# Patient Record
Sex: FEMALE | Race: WHITE | NOT HISPANIC OR LATINO | Employment: OTHER | URBAN - METROPOLITAN AREA
[De-identification: names, ages, dates, MRNs, and addresses within clinical notes are randomized per-mention and may not be internally consistent; named-entity substitution may affect disease eponyms.]

---

## 2017-05-12 ENCOUNTER — TELEPHONE (OUTPATIENT)
Dept: NEUROLOGY | Facility: CLINIC | Age: 72
End: 2017-05-12

## 2017-05-12 NOTE — TELEPHONE ENCOUNTER
"----- Message from Lesa Paynein sent at 5/12/2017  7:34 AM CDT -----  Contact: Patient herself / #preferred # [phone]  X  1st Request  _  2nd Request  _  3rd Request    Who: Floridalma Romero (mrn# [id number])    Why: Patient called requesting a call back.  Says, "she has generalized concerns and questions."  Please give a call back at your earliest convenience.   THANKS!    What Number to Call Back: #preferred # [phone]    When to Expect a call back: (Before the end of the day)   -- if the call is after 12:00, the call back will be tomorrow.                        "

## 2017-05-12 NOTE — TELEPHONE ENCOUNTER
----- Message from Nieves Bar sent at 5/12/2017  3:28 PM CDT -----  Contact: pt  x_  1st Request  _  2nd Request  _  3rd Request      Who:pt    Why: pt returning call back,please advise pt    What Number to Call Back: [phone]    When to Expect a call back: (Before the end of the day)   -- if call after 3:00 call back will be tomorrow.

## 2017-05-12 NOTE — TELEPHONE ENCOUNTER
Patient called wanting advise re: ex  who has neurological issues.  Advised her to have her contact our office to make an appointment..

## 2017-05-12 NOTE — TELEPHONE ENCOUNTER
Called a couple of times but there was no answer and could not message on the number that she had given.  However did call her at home and left a message as to call back regarding what she needs to discuss.

## 2017-05-26 ENCOUNTER — OFFICE VISIT (OUTPATIENT)
Dept: NEUROLOGY | Facility: CLINIC | Age: 72
End: 2017-05-26
Payer: MEDICARE

## 2017-05-26 VITALS
BODY MASS INDEX: 18.79 KG/M2 | SYSTOLIC BLOOD PRESSURE: 103 MMHG | HEART RATE: 79 BPM | DIASTOLIC BLOOD PRESSURE: 67 MMHG | WEIGHT: 119.69 LBS | HEIGHT: 67 IN

## 2017-05-26 DIAGNOSIS — G44.219 EPISODIC TENSION-TYPE HEADACHE, NOT INTRACTABLE: ICD-10-CM

## 2017-05-26 DIAGNOSIS — M51.37 DEGENERATION OF LUMBAR OR LUMBOSACRAL INTERVERTEBRAL DISC: Primary | ICD-10-CM

## 2017-05-26 DIAGNOSIS — F41.9 ANXIETY DISORDER, UNSPECIFIED TYPE: ICD-10-CM

## 2017-05-26 PROCEDURE — 99214 OFFICE O/P EST MOD 30 MIN: CPT | Mod: S$PBB,,, | Performed by: PSYCHIATRY & NEUROLOGY

## 2017-05-26 PROCEDURE — 99999 PR PBB SHADOW E&M-EST. PATIENT-LVL III: CPT | Mod: PBBFAC,,, | Performed by: PSYCHIATRY & NEUROLOGY

## 2017-05-26 PROCEDURE — 99213 OFFICE O/P EST LOW 20 MIN: CPT | Mod: PBBFAC,PO | Performed by: PSYCHIATRY & NEUROLOGY

## 2017-05-26 RX ORDER — ATORVASTATIN CALCIUM 40 MG/1
40 TABLET, FILM COATED ORAL DAILY
COMMUNITY

## 2017-05-26 NOTE — PROGRESS NOTES
Subjective:       Patient ID: Floridalma Romero is a 71 y.o. female.    Chief Complaint:  Back Pain and Headache      History of Present Illness  HPI   This is a 71-year-old female who is being followed by me for chronic low back pain with left leg pain in the past.  She was last seen by me in October 2015.  She has been stable but she does occasionally continue to get the low back pain and right leg pain if she is on feet for long periods of time. She has had a history of headaches that have improved. She has a history of cardiac disease being followed by Dr. Tolliver, but this has been stable.  She has now moved to [place] but comes here as she has a house here and has a daughter who lives here.  She had been  from her  over 3 years now.  She denies any new medical problems otherwise.  She recently saw her cardiac specialist and has a workup planned.  She is only on Tylenol #3 as needed for severe back pain, and Valium as needed for muscle spasms however only takes it infrequently.  She has been off the Vicodin.       Review of Systems  Review of Systems   Constitutional: Negative.    Eyes: Negative.    Respiratory: Negative.    Cardiovascular: Negative.    Gastrointestinal: Negative.    Genitourinary: Negative.    Musculoskeletal: Positive for back pain.   Skin: Negative.    Neurological: Positive for headaches.   Psychiatric/Behavioral: The patient is nervous/anxious.        Objective:      Neurologic Exam     Gait, Coordination, and Reflexes     Reflexes   Right brachioradialis: 1+  Left brachioradialis: 1+  Right biceps: 1+  Left biceps: 1+  Right triceps: 1+  Left triceps: 1+  Right patellar: 1+  Left patellar: 1+  Right achilles: 0  Left achilles: 1+      Physical Exam   Constitutional: She appears well-developed and well-nourished.   HENT:   Head: Normocephalic and atraumatic.   Right Ear: Hearing normal.   Left Ear: Hearing normal.   Eyes:   First examination showed sharp disc margins.   Pupils equal and reactive to light with EOM being full   Neck: Normal range of motion. Neck supple. Carotid bruit is not present.   Musculoskeletal:   Back exam: SLR 90 degrees bilaterally with low back pain predominantly on the right, nonradiating.   Neurological: She is alert. She displays no atrophy. No cranial nerve deficit (Visual fields at bedside testing essentially normal.  No facial asymmetry noted with facial movements and sensory exam being normal/symmetrical.  Corneals/gag reflexes normal.  Tongue & palate movements normal.  Shoulder shrug was normal.) or sensory deficit. She exhibits normal muscle tone. She displays a negative Romberg sign. Coordination and gait normal. She displays no Babinski's sign on the right side. She displays no Babinski's sign on the left side.   Reflex Scores:       Tricep reflexes are 1+ on the right side and 1+ on the left side.       Bicep reflexes are 1+ on the right side and 1+ on the left side.       Brachioradialis reflexes are 1+ on the right side and 1+ on the left side.       Patellar reflexes are 1+ on the right side and 1+ on the left side.       Achilles reflexes are 0 on the right side and 1+ on the left side.  Mental status examination: Patient is fully oriented and able to give an adequate history.  Recall of recent and past information is good.  Immediate recall is normal.  Attention span and concentration was normal.  Judgment and insight is normal.  Language functions are intact with no evidence of aphasia or dysarthria.  Comprehension is unimpaired.  Affect was slightly blunted, mood is anxious.  No thought disorder is noted.  No suicidal ideations expressed.         Assessment:        1. Degeneration of lumbar or lumbosacral intervertebral disc    2. Anxiety disorder, unspecified type    3. Episodic tension-type headache, not intractable             Plan:       Discussed back precautions with patient.  No medication changes.  She will follow-up in one year  if stable.

## 2017-06-06 DIAGNOSIS — M51.37 DEGENERATION OF LUMBAR OR LUMBOSACRAL INTERVERTEBRAL DISC: ICD-10-CM

## 2017-06-07 RX ORDER — ACETAMINOPHEN AND CODEINE PHOSPHATE 300; 30 MG/1; MG/1
TABLET ORAL
Qty: 60 TABLET | Refills: 3 | Status: SHIPPED | OUTPATIENT
Start: 2017-06-07 | End: 2017-12-06 | Stop reason: SDUPTHER

## 2017-07-20 DIAGNOSIS — G44.219 EPISODIC TENSION-TYPE HEADACHE, NOT INTRACTABLE: ICD-10-CM

## 2017-07-20 DIAGNOSIS — M51.37 DEGENERATION OF LUMBAR OR LUMBOSACRAL INTERVERTEBRAL DISC: ICD-10-CM

## 2017-07-20 RX ORDER — DIAZEPAM 5 MG/1
5 TABLET ORAL EVERY 8 HOURS PRN
Qty: 90 TABLET | Refills: 5 | Status: SHIPPED | OUTPATIENT
Start: 2017-07-20 | End: 2017-09-06 | Stop reason: SDUPTHER

## 2017-09-06 DIAGNOSIS — G44.219 EPISODIC TENSION-TYPE HEADACHE, NOT INTRACTABLE: ICD-10-CM

## 2017-09-06 DIAGNOSIS — M51.37 DEGENERATION OF LUMBAR OR LUMBOSACRAL INTERVERTEBRAL DISC: ICD-10-CM

## 2017-09-06 RX ORDER — DIAZEPAM 5 MG/1
TABLET ORAL
Qty: 90 TABLET | Refills: 3 | Status: SHIPPED | OUTPATIENT
Start: 2017-09-06 | End: 2018-04-23 | Stop reason: SDUPTHER

## 2017-12-06 DIAGNOSIS — M51.37 DEGENERATION OF LUMBAR OR LUMBOSACRAL INTERVERTEBRAL DISC: ICD-10-CM

## 2017-12-06 RX ORDER — ACETAMINOPHEN AND CODEINE PHOSPHATE 300; 30 MG/1; MG/1
1 TABLET ORAL 3 TIMES DAILY PRN
Qty: 60 TABLET | Refills: 3 | Status: SHIPPED | OUTPATIENT
Start: 2017-12-06 | End: 2018-06-15

## 2018-04-23 DIAGNOSIS — G44.219 EPISODIC TENSION-TYPE HEADACHE, NOT INTRACTABLE: ICD-10-CM

## 2018-04-23 DIAGNOSIS — M51.37 DEGENERATION OF LUMBAR OR LUMBOSACRAL INTERVERTEBRAL DISC: ICD-10-CM

## 2018-04-23 RX ORDER — DIAZEPAM 5 MG/1
TABLET ORAL
Qty: 90 TABLET | Refills: 1 | Status: SHIPPED | OUTPATIENT
Start: 2018-04-23 | End: 2018-06-15 | Stop reason: SDUPTHER

## 2018-05-21 ENCOUNTER — TELEPHONE (OUTPATIENT)
Dept: NEUROLOGY | Facility: CLINIC | Age: 73
End: 2018-05-21

## 2018-05-21 NOTE — TELEPHONE ENCOUNTER
----- Message from Dahlia Ron sent at 5/21/2018  9:50 AM CDT -----  Contact: LUCAS GILLETTE [[id number]]            Name of Who is Calling: LUCAS GILLETTE [[id number]]      What is the request in detail: Pt is calling to request a sooner appointment than July 7.  Pt says that she will be out of town come June 27th and would like be seen before she leave.  I scheduled pt for July 7 and added her to the waiting list.  Please contact pt to further discuss and advise.      Can the clinic reply by [facility]: No      What Number to Call Back if not in [facility]: [phone]

## 2018-05-21 NOTE — TELEPHONE ENCOUNTER
KALYANI for appointment on 6-8-18 in [place]. To contact me back if this is not a good date/time.

## 2018-05-30 ENCOUNTER — TELEPHONE (OUTPATIENT)
Dept: NEUROLOGY | Facility: CLINIC | Age: 73
End: 2018-05-30

## 2018-05-30 NOTE — TELEPHONE ENCOUNTER
----- Message from Dahlia CALLOWAY Asaf sent at 5/30/2018  8:47 AM CDT -----  Contact: LUCAS GILLETTE [[id number]]            Name of Who is Calling: LUCAS GILLETTE [[id number]]    What is the request in detail: Pt says that she's returning a missed call on yesterday to clinical staff.  Pt says that she won't be in town July 6 but will be in for her appointment on Melissa 15 at 8 am.  Please contact pt to further discuss and advise.    Can the clinic reply by [facility]: No      What Number to Call Back if not in [facility]: [phone] or [phone]

## 2018-06-15 ENCOUNTER — OFFICE VISIT (OUTPATIENT)
Dept: NEUROLOGY | Facility: CLINIC | Age: 73
End: 2018-06-15
Payer: MEDICARE

## 2018-06-15 VITALS
DIASTOLIC BLOOD PRESSURE: 69 MMHG | WEIGHT: 117.19 LBS | BODY MASS INDEX: 19.53 KG/M2 | HEIGHT: 65 IN | HEART RATE: 57 BPM | SYSTOLIC BLOOD PRESSURE: 105 MMHG

## 2018-06-15 DIAGNOSIS — M19.90 OSTEOARTHRITIS, UNSPECIFIED OSTEOARTHRITIS TYPE, UNSPECIFIED SITE: ICD-10-CM

## 2018-06-15 DIAGNOSIS — I25.10 CORONARY ARTERY DISEASE, ANGINA PRESENCE UNSPECIFIED, UNSPECIFIED VESSEL OR LESION TYPE, UNSPECIFIED WHETHER NATIVE OR TRANSPLANTED HEART: ICD-10-CM

## 2018-06-15 DIAGNOSIS — G44.219 EPISODIC TENSION-TYPE HEADACHE, NOT INTRACTABLE: ICD-10-CM

## 2018-06-15 DIAGNOSIS — F41.9 ANXIETY DISORDER, UNSPECIFIED TYPE: ICD-10-CM

## 2018-06-15 DIAGNOSIS — M51.37 DEGENERATION OF LUMBAR OR LUMBOSACRAL INTERVERTEBRAL DISC: Primary | ICD-10-CM

## 2018-06-15 DIAGNOSIS — E78.5 HYPERLIPIDEMIA, UNSPECIFIED HYPERLIPIDEMIA TYPE: ICD-10-CM

## 2018-06-15 PROCEDURE — 99214 OFFICE O/P EST MOD 30 MIN: CPT | Mod: S$PBB,,, | Performed by: PSYCHIATRY & NEUROLOGY

## 2018-06-15 PROCEDURE — 99999 PR PBB SHADOW E&M-EST. PATIENT-LVL III: CPT | Mod: PBBFAC,,, | Performed by: PSYCHIATRY & NEUROLOGY

## 2018-06-15 PROCEDURE — 99213 OFFICE O/P EST LOW 20 MIN: CPT | Mod: PBBFAC,PO | Performed by: PSYCHIATRY & NEUROLOGY

## 2018-06-15 RX ORDER — ACETAMINOPHEN AND CODEINE PHOSPHATE 300; 30 MG/1; MG/1
1 TABLET ORAL 3 TIMES DAILY PRN
Qty: 60 TABLET | Refills: 3 | Status: SHIPPED | OUTPATIENT
Start: 2018-06-15 | End: 2019-01-29 | Stop reason: SDUPTHER

## 2018-06-15 RX ORDER — ASPIRIN 325 MG
325 TABLET ORAL
COMMUNITY
End: 2019-04-12

## 2018-06-15 RX ORDER — DIAZEPAM 5 MG/1
5 TABLET ORAL EVERY 8 HOURS PRN
Qty: 90 TABLET | Refills: 1 | Status: SHIPPED | OUTPATIENT
Start: 2018-06-15 | End: 2018-08-23 | Stop reason: SDUPTHER

## 2018-06-15 RX ORDER — IBUPROFEN 200 MG
200 TABLET ORAL
COMMUNITY

## 2018-06-15 RX ORDER — SIMVASTATIN 40 MG/1
40 TABLET, FILM COATED ORAL
COMMUNITY
End: 2018-06-15

## 2018-06-15 NOTE — PATIENT INSTRUCTIONS
Continue medication with no changes.  Consult patient regarding avoiding overuse of pain medication.  Discussed back precautions.

## 2018-06-15 NOTE — PROGRESS NOTES
Subjective:       Patient ID: Floridalma Romero is a 72 y.o. female.    Chief Complaint:  Back Pain      History of Present Illness  HPI   This is a 72-year-old female who is being followed by me for chronic low back pain with left leg pain in the past.  She was last seen by me one year ago.  She has been stable but she does occasionally continue to get the low back pain and right leg pain if she is on feet for long periods of time. She has had a history of headaches that have improved. She has a history of cardiac disease being followed by Dr. Tolliver, but this has been stable.  She has now moved to [place] but comes here as she has a house here and has a daughter who lives here.  She had been  from her  and he recently  after a long illness.  She denies any new medical problems otherwise.  She recently saw her cardiac specialist and has an angiogram planned.  She is only on Tylenol #3 as needed for severe back pain, and Valium as needed for muscle spasms however only takes it infrequently.         Review of Systems  Review of Systems   Constitutional: Negative.    Eyes: Negative.    Respiratory: Negative.    Cardiovascular: Negative.    Gastrointestinal: Negative.    Genitourinary: Negative.    Musculoskeletal: Positive for back pain.   Skin: Negative.    Neurological: Positive for headaches.   Psychiatric/Behavioral: The patient is nervous/anxious.        Objective:      Neurologic Exam     Gait, Coordination, and Reflexes     Reflexes   Right brachioradialis: 1+  Left brachioradialis: 1+  Right biceps: 1+  Left biceps: 1+  Right triceps: 1+  Left triceps: 1+  Right patellar: 1+  Left patellar: 1+  Right achilles: 0  Left achilles: 1+      Physical Exam   Constitutional: She appears well-developed and well-nourished.   HENT:   Head: Normocephalic and atraumatic.   Right Ear: Hearing normal.   Left Ear: Hearing normal.   Eyes:   First examination showed sharp disc margins.  Pupils equal and  reactive to light with EOM being full   Neck: Normal range of motion. Neck supple. Carotid bruit is not present.   Musculoskeletal:   Back exam: SLR 90 degrees bilaterally with low back pain predominantly on the right, nonradiating.   Neurological: She is alert. She displays no atrophy. No cranial nerve deficit (Visual fields at bedside testing essentially normal.  No facial asymmetry noted with facial movements and sensory exam being normal/symmetrical.  Corneals/gag reflexes normal.  Tongue & palate movements normal.  Shoulder shrug was normal.) or sensory deficit. She exhibits normal muscle tone. She displays a negative Romberg sign. Coordination and gait normal. She displays no Babinski's sign on the right side. She displays no Babinski's sign on the left side.   Reflex Scores:       Tricep reflexes are 1+ on the right side and 1+ on the left side.       Bicep reflexes are 1+ on the right side and 1+ on the left side.       Brachioradialis reflexes are 1+ on the right side and 1+ on the left side.       Patellar reflexes are 1+ on the right side and 1+ on the left side.       Achilles reflexes are 0 on the right side and 1+ on the left side.  Mental status examination: Patient is fully oriented and able to give an adequate history.  Recall of recent and past information is good.  Immediate recall is normal.  Attention span and concentration was normal.  Judgment and insight is normal.  Language functions are intact with no evidence of aphasia or dysarthria.  Comprehension is unimpaired.  Affect was slightly blunted, mood is anxious.  No thought disorder is noted.  No suicidal ideations expressed.   Vitals reviewed.        Assessment:        1. Degeneration of lumbar or lumbosacral intervertebral disc    2. Episodic tension-type headache, not intractable    3. Osteoarthritis, unspecified osteoarthritis type, unspecified site    4. Hyperlipidemia, unspecified hyperlipidemia type    5. Coronary artery disease,  angina presence unspecified, unspecified vessel or lesion type, unspecified whether native or transplanted heart    6. Anxiety disorder, unspecified type             Plan:       Discussed back precautions with patient.  No medication changes.  She will follow-up in one year if stable.

## 2018-08-23 ENCOUNTER — TELEPHONE (OUTPATIENT)
Dept: NEUROLOGY | Facility: CLINIC | Age: 73
End: 2018-08-23

## 2018-08-23 DIAGNOSIS — M51.37 DEGENERATION OF LUMBAR OR LUMBOSACRAL INTERVERTEBRAL DISC: ICD-10-CM

## 2018-08-23 DIAGNOSIS — G44.219 EPISODIC TENSION-TYPE HEADACHE, NOT INTRACTABLE: ICD-10-CM

## 2018-08-23 RX ORDER — DIAZEPAM 5 MG/1
5 TABLET ORAL EVERY 8 HOURS PRN
Qty: 90 TABLET | Refills: 1 | Status: SHIPPED | OUTPATIENT
Start: 2018-08-23 | End: 2018-08-24 | Stop reason: SDUPTHER

## 2018-08-23 RX ORDER — DIAZEPAM 5 MG/1
TABLET ORAL
Qty: 90 TABLET | Refills: 0 | OUTPATIENT
Start: 2018-08-23

## 2018-08-23 NOTE — TELEPHONE ENCOUNTER
----- Message from Tyra Mejia sent at 8/23/2018  9:12 AM CDT -----  Contact: LUCAS GILLETTE [[id number]]            Name of Who is Calling: LUCAS GILLETTE [[id number]]      What is the request in detail: patient states [facility] pharmacy need the okay to fill Valium medication. Please call     Can the clinic reply by [facility]: no    [facility]: [phone]

## 2018-08-24 RX ORDER — DIAZEPAM 5 MG/1
5 TABLET ORAL EVERY 8 HOURS PRN
Qty: 90 TABLET | Refills: 1 | Status: SHIPPED | OUTPATIENT
Start: 2018-08-24 | End: 2019-01-29 | Stop reason: SDUPTHER

## 2019-01-29 DIAGNOSIS — G44.219 EPISODIC TENSION-TYPE HEADACHE, NOT INTRACTABLE: ICD-10-CM

## 2019-01-29 DIAGNOSIS — M51.37 DEGENERATION OF LUMBAR OR LUMBOSACRAL INTERVERTEBRAL DISC: ICD-10-CM

## 2019-01-29 RX ORDER — DIAZEPAM 5 MG/1
TABLET ORAL
Qty: 90 TABLET | Refills: 2 | Status: SHIPPED | OUTPATIENT
Start: 2019-01-29 | End: 2019-06-21 | Stop reason: SDUPTHER

## 2019-01-29 RX ORDER — ACETAMINOPHEN AND CODEINE PHOSPHATE 300; 30 MG/1; MG/1
TABLET ORAL
Qty: 60 TABLET | Refills: 0 | Status: SHIPPED | OUTPATIENT
Start: 2019-01-29 | End: 2019-03-28 | Stop reason: SDUPTHER

## 2019-03-28 DIAGNOSIS — M51.37 DEGENERATION OF LUMBAR OR LUMBOSACRAL INTERVERTEBRAL DISC: ICD-10-CM

## 2019-03-28 RX ORDER — ACETAMINOPHEN AND CODEINE PHOSPHATE 300; 30 MG/1; MG/1
TABLET ORAL
Qty: 60 TABLET | Refills: 0 | Status: SHIPPED | OUTPATIENT
Start: 2019-03-28 | End: 2019-05-07 | Stop reason: SDUPTHER

## 2019-03-28 NOTE — TELEPHONE ENCOUNTER
acetaminophen-codeine 300-30mg (TYLENOL #3) 300-30 mg Tab          Summary: TAKE ONE BY MOUTH THREE TIMES DAILY AS NEEDED, Normal   Start: 1/29/2019  Ord/Sold: 1/29/2019 (O)  Report  Adh:   Long-term:   Pharmacy: [facility] - [place]rell - [address]  Med Dose History       Patient Sig: TAKE ONE BY MOUTH THREE TIMES DAILY AS NEEDED       Ordered on: 1/29/2019       Authorized by: YANIRA ASTUDILLO       Dispense: 60 tablet       Refills: 0 ordered

## 2019-03-30 ENCOUNTER — OFFICE VISIT (OUTPATIENT)
Dept: URGENT CARE | Facility: CLINIC | Age: 74
End: 2019-03-30
Payer: MEDICARE

## 2019-03-30 VITALS
RESPIRATION RATE: 19 BRPM | WEIGHT: 119 LBS | HEART RATE: 79 BPM | DIASTOLIC BLOOD PRESSURE: 63 MMHG | TEMPERATURE: 98 F | HEIGHT: 67 IN | SYSTOLIC BLOOD PRESSURE: 90 MMHG | OXYGEN SATURATION: 94 % | BODY MASS INDEX: 18.68 KG/M2

## 2019-03-30 DIAGNOSIS — J20.9 ACUTE BRONCHITIS, UNSPECIFIED ORGANISM: Primary | ICD-10-CM

## 2019-03-30 DIAGNOSIS — R06.2 WHEEZING: ICD-10-CM

## 2019-03-30 PROCEDURE — 99203 OFFICE O/P NEW LOW 30 MIN: CPT | Mod: 25,S$GLB,, | Performed by: NURSE PRACTITIONER

## 2019-03-30 PROCEDURE — 71046 XR CHEST PA AND LATERAL: ICD-10-PCS | Mod: FY,S$GLB,, | Performed by: RADIOLOGY

## 2019-03-30 PROCEDURE — 99203 PR OFFICE/OUTPT VISIT, NEW, LEVL III, 30-44 MIN: ICD-10-PCS | Mod: 25,S$GLB,, | Performed by: NURSE PRACTITIONER

## 2019-03-30 PROCEDURE — 94640 PR INHAL RX, AIRWAY OBST/DX SPUTUM INDUCT: ICD-10-PCS | Mod: 59,S$GLB,, | Performed by: NURSE PRACTITIONER

## 2019-03-30 PROCEDURE — 94640 AIRWAY INHALATION TREATMENT: CPT | Mod: 59,S$GLB,, | Performed by: NURSE PRACTITIONER

## 2019-03-30 PROCEDURE — 71046 X-RAY EXAM CHEST 2 VIEWS: CPT | Mod: FY,S$GLB,, | Performed by: RADIOLOGY

## 2019-03-30 RX ORDER — IPRATROPIUM BROMIDE 0.5 MG/2.5ML
0.5 SOLUTION RESPIRATORY (INHALATION)
Status: COMPLETED | OUTPATIENT
Start: 2019-03-30 | End: 2019-03-30

## 2019-03-30 RX ORDER — DOXYCYCLINE 100 MG/1
100 CAPSULE ORAL 2 TIMES DAILY
Qty: 14 CAPSULE | Refills: 0 | Status: SHIPPED | OUTPATIENT
Start: 2019-03-30 | End: 2019-04-06

## 2019-03-30 RX ORDER — ALBUTEROL SULFATE 0.83 MG/ML
2.5 SOLUTION RESPIRATORY (INHALATION)
Status: COMPLETED | OUTPATIENT
Start: 2019-03-30 | End: 2019-03-30

## 2019-03-30 RX ORDER — ALBUTEROL SULFATE 90 UG/1
2 AEROSOL, METERED RESPIRATORY (INHALATION)
Qty: 1 INHALER | Refills: 0 | Status: SHIPPED | OUTPATIENT
Start: 2019-03-30

## 2019-03-30 RX ORDER — PREDNISONE 20 MG/1
20 TABLET ORAL DAILY
Qty: 4 TABLET | Refills: 0 | Status: SHIPPED | OUTPATIENT
Start: 2019-03-30 | End: 2019-04-03

## 2019-03-30 RX ADMIN — IPRATROPIUM BROMIDE 0.5 MG: 0.5 SOLUTION RESPIRATORY (INHALATION) at 11:03

## 2019-03-30 RX ADMIN — ALBUTEROL SULFATE 2.5 MG: 0.83 SOLUTION RESPIRATORY (INHALATION) at 11:03

## 2019-03-30 NOTE — PATIENT INSTRUCTIONS
START USING HER BREO INHALER AS PRESCRIBED.  USE ALBUTEROL AS NEEDED EVERY 4-6 HOURS FOR WHEEZING/SHORTNESS OF BREATH.  START TAKING MUCINEX TWICE A DAY.  DELSYM FOR COUGH.  FOLLOW UP WITH YOUR PCP IF YOU CONTINUE TO WORSEN OR DO NOT IMPROVE.     SMOKING CESSATION EDUCATION- CONTINUE FOLLOW UP WITH EJ REGARDING THIS AS THIS IS GREAT FOR YOUR OVERALL HEALTH    You must understand that you've received an Urgent Care treatment only and that you may be released before all your medical problems are known or treated. You, the patient, will arrange for follow up care as instructed.  If your condition worsens we recommend that you receive another evaluation at the emergency room immediately or contact your primary medical clinics after hours call service to discuss your concerns.  Please return here or go to the Emergency Department for any concerns or worsening of condition.      Bronchitis with Wheezing (Viral or Bacterial: Adult)    Bronchitis is an infection of the air passages. It often occurs during a cold and is usually caused by a virus. Symptoms include cough with mucus (phlegm) and low-grade fever. This illness is contagious during the first few days and is spread through the air by coughing and sneezing, or by direct contact (touching the sick person and then touching your own eyes, nose, or mouth).  If there is a lot of inflammation, air flow is restricted. The air passages may also go into spasm, especially if you have asthma. This causes wheezing and difficulty breathing even in people who do not have asthma.  Bronchitis usually lasts 7 to 14 days. The wheezing should improve with treatment during the first week. An inhaler is often prescribed to relax the air passages and stop wheezing. Antibiotics will be prescribed if your doctor thinks there is also a secondary bacterial infection.  Home care  · If symptoms are severe, rest at home for the first 2 to 3 days. When you go back to your usual activities,  don't let yourself get too tired.  · Do not smoke. Also avoid being exposed to secondhand smoke.  · You may use over-the-counter medicine to control fever or pain, unless another medicine was prescribed. Note: If you have chronic liver or kidney disease or have ever had a stomach ulcer or gastrointestinal bleeding, talk with your healthcare provider before using these medicines. Also talk to your provider if you are taking medicine to prevent blood clots.) Aspirin should never be given to anyone younger than 18 years of age who is ill with a viral infection or fever. It may cause severe liver or brain damage.  · Your appetite may be poor, so a light diet is fine. Avoid dehydration by drinking 6 to 8 glasses of fluids per day (such as water, soft drinks, sports drinks, juices, tea, or soup). Extra fluids will help loosen secretions in the nose and lungs.  · Over-the-counter cough, cold, and sore-throat medicines will not shorten the length of the illness, but they may be helpful to reduce symptoms. (Note: Do not use decongestants if you have high blood pressure.)  · If you were given an inhaler, use it exactly as directed. If you need to use it more often than prescribed, your condition may be worsening. If this happens, contact your healthcare provider.  · If prescribed, finish all antibiotic medicine, even if you are feeling better after only a few days.  Follow-up care  Follow up with your healthcare provider, or as advised. If you had an X-ray or ECG (electrocardiogram), a specialist will review it. You will be notified of any new findings that may affect your care.  Note: If you are age 65 or older, or if you have a chronic lung disease or condition that affects your immune system, or you smoke, talk to your healthcare provider about having a pneumococcal vaccinations and a yearly influenza vaccination (flu shot).  When to seek medical advice  Call your healthcare provider right away if any of these  occur:  · Fever of 100.4°F (38°C) or higher  · Coughing up increasing amounts of colored sputum  · Weakness, drowsiness, headache, facial pain, ear pain, or a stiff neck  Call 911, or get immediate medical care  Contact emergency services right away if any of these occur.  · Coughing up blood  · Worsening weakness, drowsiness, headache, or stiff neck  · Increased wheezing not helped with medication, shortness of breath, or pain with breathing  Date Last Reviewed: 9/13/2015  © 7172-1090 [employer]. [address] [place] [address]. All rights reserved. This information is not intended as a substitute for professional medical care. Always follow your healthcare professional's instructions.

## 2019-03-30 NOTE — PROGRESS NOTES
"Subjective:       Patient ID: Floridalma Romero is a 73 y.o. female.    Vitals:  height is 5' 7" (1.702 m) and weight is 54 kg (119 lb). Her oral temperature is 98.4 °F (36.9 °C). Her blood pressure is 90/63 and her pulse is 79. Her respiration is 19 and oxygen saturation is 94% (abnormal).     Chief Complaint: Cough    This is a 73 year old female who presents to clinic today with complaints of wheezing, cough, and chills for the past three weeks. She states she does smoke and has a referral for smoking cessation program. She is a patient at . She states she does have a hx of emphysema and has been prescribed Breo but hasn't started using it yet.     Cough   This is a new problem. The current episode started more than 1 month ago (16 weeks ). The problem has been unchanged. The problem occurs constantly. The cough is non-productive. Associated symptoms include chills and wheezing. Pertinent negatives include no chest pain, ear congestion, ear pain, eye redness, fever, headaches, heartburn, hemoptysis, myalgias, nasal congestion, postnasal drip, rash, rhinorrhea, sore throat, shortness of breath, sweats or weight loss. The symptoms are aggravated by lying down. She has tried ipratropium inhaler (aspirin) for the symptoms. The treatment provided mild relief. Her past medical history is significant for bronchitis. There is no history of asthma, bronchiectasis, COPD, emphysema, environmental allergies or pneumonia.       Constitution: Positive for chills. Negative for sweating, fatigue and fever.   HENT: Negative for ear pain, congestion, postnasal drip, sinus pain, sinus pressure, sore throat and voice change.    Neck: Negative for neck pain and painful lymph nodes.   Cardiovascular: Negative for chest pain.   Eyes: Negative for eye redness.   Respiratory: Positive for cough and wheezing. Negative for chest tightness, sputum production, bloody sputum, COPD, shortness of breath, stridor and asthma.  "   Gastrointestinal: Negative for abdominal pain, nausea, vomiting, diarrhea and heartburn.   Genitourinary: Negative for dysuria.   Musculoskeletal: Negative for muscle ache.   Skin: Negative for rash.   Allergic/Immunologic: Negative for environmental allergies, seasonal allergies, asthma and sneezing.   Neurological: Negative for headaches.   Hematologic/Lymphatic: Negative for swollen lymph nodes.       Objective:      Physical Exam   Constitutional: She is oriented to person, place, and time. She appears well-developed and well-nourished. She is cooperative.  Non-toxic appearance. She does not appear ill. No distress.   HENT:   Head: Normocephalic and atraumatic.   Right Ear: Hearing, tympanic membrane, external ear and ear canal normal.   Left Ear: Hearing, tympanic membrane, external ear and ear canal normal.   Nose: Nose normal. No mucosal edema, rhinorrhea or nasal deformity. No epistaxis. Right sinus exhibits no maxillary sinus tenderness and no frontal sinus tenderness. Left sinus exhibits no maxillary sinus tenderness and no frontal sinus tenderness.   Mouth/Throat: Uvula is midline, oropharynx is clear and moist and mucous membranes are normal. No trismus in the jaw. Normal dentition. No uvula swelling. No posterior oropharyngeal erythema.   Eyes: Conjunctivae and lids are normal. No scleral icterus.   Sclera clear bilat   Neck: Trachea normal, full passive range of motion without pain and phonation normal. Neck supple.   Cardiovascular: Normal rate, regular rhythm, normal heart sounds, intact distal pulses and normal pulses.   Pulmonary/Chest: Effort normal. No respiratory distress. She has wheezes in the right upper field, the right middle field, the right lower field and the left middle field.   PRE-TREATMENT:  94      SpO2%                  POST-TREATMENT:   95  SpO2%               Lung Sounds: still with exp wheezing    Pt tolerated breathing treatment well and reports significant improvement.    Abdominal: Soft. Normal appearance and bowel sounds are normal. She exhibits no distension. There is no tenderness.   Musculoskeletal: Normal range of motion. She exhibits no edema or deformity.   Neurological: She is alert and oriented to person, place, and time. She exhibits normal muscle tone. Coordination normal.   Skin: Skin is warm, dry and intact. She is not diaphoretic. No pallor.   Psychiatric: She has a normal mood and affect. Her speech is normal and behavior is normal. Judgment and thought content normal. Cognition and memory are normal.   Nursing note and vitals reviewed.      CXR: Frontal lateral views show sternotomy wires.  Heart is normal size.  The visualized spine appears intact.  No pneumothorax or pleural effusion or interstitial edema or consolidation or nodular cavitary lesion or abdominal free air.  There is perhaps mild hyperinflation.  The trachea appears normal.  The mediastinal contours and hilar shadows appear normal.  Assessment:       1. Acute bronchitis, unspecified organism    2. Wheezing        Plan:         Acute bronchitis, unspecified organism  -     albuterol (PROVENTIL/VENTOLIN HFA) 90 mcg/actuation inhaler; Inhale 2 puffs into the lungs every 4 to 6 hours as needed for Wheezing or Shortness of Breath. Rescue  Dispense: 1 Inhaler; Refill: 0  -     predniSONE (DELTASONE) 20 MG tablet; Take 1 tablet (20 mg total) by mouth once daily. for 4 days  Dispense: 4 tablet; Refill: 0  -     doxycycline (VIBRAMYCIN) 100 MG Cap; Take 1 capsule (100 mg total) by mouth 2 (two) times daily. for 7 days  Dispense: 14 capsule; Refill: 0    Wheezing  -     XR CHEST PA AND LATERAL; Future; Expected date: 03/30/2019  -     ipratropium 0.02 % nebulizer solution 0.5 mg  -     albuterol nebulizer solution 2.5 mg            Patient Instructions     START USING HER BREO INHALER AS PRESCRIBED.  USE ALBUTEROL AS NEEDED EVERY 4-6 HOURS FOR WHEEZING/SHORTNESS OF BREATH.  START TAKING MUCINEX TWICE A DAY.   DELSYM FOR COUGH.  FOLLOW UP WITH YOUR PCP IF YOU CONTINUE TO WORSEN OR DO NOT IMPROVE.     SMOKING CESSATION EDUCATION- CONTINUE FOLLOW UP WITH EJ REGARDING THIS AS THIS IS GREAT FOR YOUR OVERALL HEALTH    You must understand that you've received an Urgent Care treatment only and that you may be released before all your medical problems are known or treated. You, the patient, will arrange for follow up care as instructed.  If your condition worsens we recommend that you receive another evaluation at the emergency room immediately or contact your primary medical clinics after hours call service to discuss your concerns.  Please return here or go to the Emergency Department for any concerns or worsening of condition.      Bronchitis with Wheezing (Viral or Bacterial: Adult)    Bronchitis is an infection of the air passages. It often occurs during a cold and is usually caused by a virus. Symptoms include cough with mucus (phlegm) and low-grade fever. This illness is contagious during the first few days and is spread through the air by coughing and sneezing, or by direct contact (touching the sick person and then touching your own eyes, nose, or mouth).  If there is a lot of inflammation, air flow is restricted. The air passages may also go into spasm, especially if you have asthma. This causes wheezing and difficulty breathing even in people who do not have asthma.  Bronchitis usually lasts 7 to 14 days. The wheezing should improve with treatment during the first week. An inhaler is often prescribed to relax the air passages and stop wheezing. Antibiotics will be prescribed if your doctor thinks there is also a secondary bacterial infection.  Home care  · If symptoms are severe, rest at home for the first 2 to 3 days. When you go back to your usual activities, don't let yourself get too tired.  · Do not smoke. Also avoid being exposed to secondhand smoke.  · You may use over-the-counter medicine to control fever or  pain, unless another medicine was prescribed. Note: If you have chronic liver or kidney disease or have ever had a stomach ulcer or gastrointestinal bleeding, talk with your healthcare provider before using these medicines. Also talk to your provider if you are taking medicine to prevent blood clots.) Aspirin should never be given to anyone younger than 18 years of age who is ill with a viral infection or fever. It may cause severe liver or brain damage.  · Your appetite may be poor, so a light diet is fine. Avoid dehydration by drinking 6 to 8 glasses of fluids per day (such as water, soft drinks, sports drinks, juices, tea, or soup). Extra fluids will help loosen secretions in the nose and lungs.  · Over-the-counter cough, cold, and sore-throat medicines will not shorten the length of the illness, but they may be helpful to reduce symptoms. (Note: Do not use decongestants if you have high blood pressure.)  · If you were given an inhaler, use it exactly as directed. If you need to use it more often than prescribed, your condition may be worsening. If this happens, contact your healthcare provider.  · If prescribed, finish all antibiotic medicine, even if you are feeling better after only a few days.  Follow-up care  Follow up with your healthcare provider, or as advised. If you had an X-ray or ECG (electrocardiogram), a specialist will review it. You will be notified of any new findings that may affect your care.  Note: If you are age 65 or older, or if you have a chronic lung disease or condition that affects your immune system, or you smoke, talk to your healthcare provider about having a pneumococcal vaccinations and a yearly influenza vaccination (flu shot).  When to seek medical advice  Call your healthcare provider right away if any of these occur:  · Fever of 100.4°F (38°C) or higher  · Coughing up increasing amounts of colored sputum  · Weakness, drowsiness, headache, facial pain, ear pain, or a stiff  neck  Call 911, or get immediate medical care  Contact emergency services right away if any of these occur.  · Coughing up blood  · Worsening weakness, drowsiness, headache, or stiff neck  · Increased wheezing not helped with medication, shortness of breath, or pain with breathing  Date Last Reviewed: 9/13/2015  © 4924-3538 [employer]. [address] [place] [address], [place] [address]. All rights reserved. This information is not intended as a substitute for professional medical care. Always follow your healthcare professional's instructions.

## 2019-04-09 ENCOUNTER — TELEPHONE (OUTPATIENT)
Dept: NEUROLOGY | Facility: CLINIC | Age: 74
End: 2019-04-09

## 2019-04-09 NOTE — TELEPHONE ENCOUNTER
----- Message from Brittany Larose sent at 4/9/2019  4:47 PM CDT -----  Contact: luis a  Name of Who is Calling: aziza      What is the request in detail: Patient is requesting a call back she states she needs to be seen before 05/24/2019 she states she cant take the pain any longer      Can the clinic reply by [facility]: no      What Number to Call Back if not in [facility]: [phone]

## 2019-04-09 NOTE — TELEPHONE ENCOUNTER
----- Message from Brittany Larose sent at 4/9/2019  4:47 PM CDT -----  Contact: luis a  Name of Who is Calling: aziza      What is the request in detail: Patient is requesting a call back she states she needs to be seen before 05/24/2019 she states she cant take the pain any longer      Can the clinic reply by [facility]: no      What Number to Call Back if not in [facility]: [phone]

## 2019-04-12 ENCOUNTER — OFFICE VISIT (OUTPATIENT)
Dept: NEUROLOGY | Facility: CLINIC | Age: 74
End: 2019-04-12
Payer: MEDICARE

## 2019-04-12 VITALS
DIASTOLIC BLOOD PRESSURE: 67 MMHG | WEIGHT: 120.5 LBS | HEART RATE: 77 BPM | HEIGHT: 67 IN | BODY MASS INDEX: 18.91 KG/M2 | SYSTOLIC BLOOD PRESSURE: 106 MMHG

## 2019-04-12 DIAGNOSIS — I10 ESSENTIAL HYPERTENSION: ICD-10-CM

## 2019-04-12 DIAGNOSIS — M51.37 DEGENERATION OF LUMBAR OR LUMBOSACRAL INTERVERTEBRAL DISC: Primary | ICD-10-CM

## 2019-04-12 DIAGNOSIS — I25.10 CHRONIC CORONARY ARTERY DISEASE: ICD-10-CM

## 2019-04-12 DIAGNOSIS — F41.9 ANXIETY DISORDER, UNSPECIFIED TYPE: ICD-10-CM

## 2019-04-12 DIAGNOSIS — E78.5 HYPERLIPIDEMIA, UNSPECIFIED HYPERLIPIDEMIA TYPE: ICD-10-CM

## 2019-04-12 DIAGNOSIS — G44.219 EPISODIC TENSION-TYPE HEADACHE, NOT INTRACTABLE: ICD-10-CM

## 2019-04-12 DIAGNOSIS — M19.90 OSTEOARTHRITIS, UNSPECIFIED OSTEOARTHRITIS TYPE, UNSPECIFIED SITE: ICD-10-CM

## 2019-04-12 PROCEDURE — 99999 PR PBB SHADOW E&M-EST. PATIENT-LVL III: CPT | Mod: PBBFAC,,, | Performed by: PSYCHIATRY & NEUROLOGY

## 2019-04-12 PROCEDURE — 99214 OFFICE O/P EST MOD 30 MIN: CPT | Mod: S$PBB,,, | Performed by: PSYCHIATRY & NEUROLOGY

## 2019-04-12 PROCEDURE — 99214 PR OFFICE/OUTPT VISIT, EST, LEVL IV, 30-39 MIN: ICD-10-PCS | Mod: S$PBB,,, | Performed by: PSYCHIATRY & NEUROLOGY

## 2019-04-12 PROCEDURE — 99999 PR PBB SHADOW E&M-EST. PATIENT-LVL III: ICD-10-PCS | Mod: PBBFAC,,, | Performed by: PSYCHIATRY & NEUROLOGY

## 2019-04-12 PROCEDURE — 99213 OFFICE O/P EST LOW 20 MIN: CPT | Mod: PBBFAC,PO | Performed by: PSYCHIATRY & NEUROLOGY

## 2019-04-12 RX ORDER — AMIODARONE HYDROCHLORIDE 200 MG/1
TABLET ORAL
COMMUNITY
Start: 2018-07-16 | End: 2019-04-12

## 2019-04-12 RX ORDER — OXYCODONE AND ACETAMINOPHEN 5; 325 MG/1; MG/1
TABLET ORAL
COMMUNITY
Start: 2018-07-16 | End: 2019-04-12

## 2019-04-12 RX ORDER — NITROGLYCERIN 0.4 MG/1
TABLET SUBLINGUAL
COMMUNITY
Start: 2018-06-28

## 2019-04-12 NOTE — PROGRESS NOTES
Subjective:       Patient ID: Floridalma Romero is a 73 y.o. female.    Chief Complaint:  Back Pain      History of Present Illness  HPI   This is a 73-year-old female who is being followed by me for chronic low back pain with left leg pain in the past.  She was last seen by me one year ago.  She has been stable but she does occasionally continue to get the low back pain and right leg pain if she is on feet for long periods of time. She has had a history of headaches that have improved. She has a history of cardiac disease being followed by Dr. Tolliver, and in July 2018 she underwent bypass surgery.  She had moved to [place], since the death of her  in 2017, but comes here as she has a house here and has a daughter who lives here.  In addition all of her doctors are here.  She is only on Tylenol #3 as needed for severe back pain, and Valium as needed for muscle spasms however only takes it infrequently.         Review of Systems  Review of Systems   Constitutional: Negative.    Eyes: Negative.    Respiratory: Negative.    Cardiovascular: Negative.    Gastrointestinal: Negative.    Genitourinary: Negative.    Musculoskeletal: Positive for back pain.   Skin: Negative.    Neurological: Positive for headaches.   Psychiatric/Behavioral: The patient is nervous/anxious.        Objective:      Neurologic Exam     Gait, Coordination, and Reflexes     Reflexes   Right brachioradialis: 1+  Left brachioradialis: 1+  Right biceps: 1+  Left biceps: 1+  Right triceps: 1+  Left triceps: 1+  Right patellar: 1+  Left patellar: 1+  Right achilles: 0  Left achilles: 1+      Physical Exam   Constitutional: She appears well-developed and well-nourished.   HENT:   Head: Normocephalic and atraumatic.   Right Ear: Hearing normal.   Left Ear: Hearing normal.   Eyes:   First examination showed sharp disc margins.  Pupils equal and reactive to light with EOM being full   Neck: Normal range of motion. Neck supple. Carotid bruit is not  present.   Musculoskeletal:   Back exam: SLR 90 degrees bilaterally with low back pain predominantly on the right, nonradiating.   Neurological: She is alert. She displays no atrophy. No cranial nerve deficit (Visual fields at bedside testing essentially normal.  No facial asymmetry noted with facial movements and sensory exam being normal/symmetrical.  Corneals/gag reflexes normal.  Tongue & palate movements normal.  Shoulder shrug was normal.) or sensory deficit. She exhibits normal muscle tone. She displays a negative Romberg sign. Coordination and gait normal. She displays no Babinski's sign on the right side. She displays no Babinski's sign on the left side.   Reflex Scores:       Tricep reflexes are 1+ on the right side and 1+ on the left side.       Bicep reflexes are 1+ on the right side and 1+ on the left side.       Brachioradialis reflexes are 1+ on the right side and 1+ on the left side.       Patellar reflexes are 1+ on the right side and 1+ on the left side.       Achilles reflexes are 0 on the right side and 1+ on the left side.  Mental status examination: Patient is fully oriented and able to give an adequate history.  Recall of recent and past information is good.  Immediate recall is normal.  Attention span and concentration was normal.  Judgment and insight is normal.  Language functions are intact with no evidence of aphasia or dysarthria.  Comprehension is unimpaired.  Affect was slightly blunted, mood is anxious.  No thought disorder is noted.  No suicidal ideations expressed.   Vitals reviewed.        Assessment:        1. Degeneration of lumbar or lumbosacral intervertebral disc    2. Episodic tension-type headache, not intractable    3. Anxiety disorder, unspecified type    4. Chronic coronary artery disease    5. Hyperlipidemia, unspecified hyperlipidemia type    6. Osteoarthritis, unspecified osteoarthritis type, unspecified site    7. Essential hypertension             Plan:        Discussed back precautions with patient.  No medication changes.  Control of vascular risk factors is important.  She will follow-up in one year if stable.

## 2019-05-07 DIAGNOSIS — M51.37 DEGENERATION OF LUMBAR OR LUMBOSACRAL INTERVERTEBRAL DISC: ICD-10-CM

## 2019-05-07 RX ORDER — ACETAMINOPHEN AND CODEINE PHOSPHATE 300; 30 MG/1; MG/1
TABLET ORAL
Qty: 60 TABLET | Refills: 0 | Status: SHIPPED | OUTPATIENT
Start: 2019-05-07 | End: 2019-06-24 | Stop reason: SDUPTHER

## 2019-06-21 DIAGNOSIS — M51.37 DEGENERATION OF LUMBAR OR LUMBOSACRAL INTERVERTEBRAL DISC: ICD-10-CM

## 2019-06-21 DIAGNOSIS — G44.219 EPISODIC TENSION-TYPE HEADACHE, NOT INTRACTABLE: ICD-10-CM

## 2019-06-23 RX ORDER — DIAZEPAM 5 MG/1
TABLET ORAL
Qty: 90 TABLET | Refills: 2 | Status: SHIPPED | OUTPATIENT
Start: 2019-06-23

## 2019-06-24 DIAGNOSIS — M51.37 DEGENERATION OF LUMBAR OR LUMBOSACRAL INTERVERTEBRAL DISC: ICD-10-CM

## 2019-06-24 RX ORDER — ACETAMINOPHEN AND CODEINE PHOSPHATE 300; 30 MG/1; MG/1
TABLET ORAL
Qty: 60 TABLET | Refills: 0 | Status: SHIPPED | OUTPATIENT
Start: 2019-06-24

## 2020-03-08 ENCOUNTER — OFFICE VISIT (OUTPATIENT)
Dept: URGENT CARE | Facility: CLINIC | Age: 75
End: 2020-03-08
Payer: MEDICARE

## 2020-03-08 VITALS
SYSTOLIC BLOOD PRESSURE: 143 MMHG | BODY MASS INDEX: 18.83 KG/M2 | OXYGEN SATURATION: 98 % | WEIGHT: 120 LBS | HEART RATE: 81 BPM | HEIGHT: 67 IN | RESPIRATION RATE: 16 BRPM | TEMPERATURE: 99 F | DIASTOLIC BLOOD PRESSURE: 83 MMHG

## 2020-03-08 DIAGNOSIS — R06.2 WHEEZING: ICD-10-CM

## 2020-03-08 DIAGNOSIS — J41.1 PURULENT BRONCHITIS: Primary | ICD-10-CM

## 2020-03-08 DIAGNOSIS — R05.9 COUGH: ICD-10-CM

## 2020-03-08 PROCEDURE — 99214 OFFICE O/P EST MOD 30 MIN: CPT | Mod: 25,S$GLB,, | Performed by: NURSE PRACTITIONER

## 2020-03-08 PROCEDURE — 71046 X-RAY EXAM CHEST 2 VIEWS: CPT | Mod: FY,S$GLB,, | Performed by: RADIOLOGY

## 2020-03-08 PROCEDURE — 94640 PR INHAL RX, AIRWAY OBST/DX SPUTUM INDUCT: ICD-10-PCS | Mod: S$GLB,,, | Performed by: NURSE PRACTITIONER

## 2020-03-08 PROCEDURE — 71046 XR CHEST PA AND LATERAL: ICD-10-PCS | Mod: FY,S$GLB,, | Performed by: RADIOLOGY

## 2020-03-08 PROCEDURE — 94640 AIRWAY INHALATION TREATMENT: CPT | Mod: S$GLB,,, | Performed by: NURSE PRACTITIONER

## 2020-03-08 PROCEDURE — 99214 PR OFFICE/OUTPT VISIT, EST, LEVL IV, 30-39 MIN: ICD-10-PCS | Mod: 25,S$GLB,, | Performed by: NURSE PRACTITIONER

## 2020-03-08 RX ORDER — BENZONATATE 100 MG/1
100 CAPSULE ORAL 3 TIMES DAILY PRN
Qty: 30 CAPSULE | Refills: 0 | Status: SHIPPED | OUTPATIENT
Start: 2020-03-08 | End: 2020-03-18

## 2020-03-08 RX ORDER — DOXYCYCLINE 100 MG/1
100 CAPSULE ORAL EVERY 12 HOURS
Qty: 14 CAPSULE | Refills: 0 | Status: SHIPPED | OUTPATIENT
Start: 2020-03-08 | End: 2020-03-15

## 2020-03-08 RX ORDER — IPRATROPIUM BROMIDE 0.5 MG/2.5ML
0.5 SOLUTION RESPIRATORY (INHALATION)
Status: COMPLETED | OUTPATIENT
Start: 2020-03-08 | End: 2020-03-08

## 2020-03-08 RX ORDER — PREDNISONE 20 MG/1
20 TABLET ORAL 2 TIMES DAILY
Qty: 10 TABLET | Refills: 0 | Status: SHIPPED | OUTPATIENT
Start: 2020-03-08 | End: 2020-03-13

## 2020-03-08 RX ORDER — PROMETHAZINE HYDROCHLORIDE AND DEXTROMETHORPHAN HYDROBROMIDE 6.25; 15 MG/5ML; MG/5ML
5 SYRUP ORAL
Qty: 118 ML | Refills: 0 | Status: SHIPPED | OUTPATIENT
Start: 2020-03-08

## 2020-03-08 RX ORDER — ALBUTEROL SULFATE 90 UG/1
2 AEROSOL, METERED RESPIRATORY (INHALATION) EVERY 6 HOURS PRN
Qty: 18 G | Refills: 0 | Status: SHIPPED | OUTPATIENT
Start: 2020-03-08

## 2020-03-08 RX ORDER — ALBUTEROL SULFATE 0.83 MG/ML
2.5 SOLUTION RESPIRATORY (INHALATION)
Status: COMPLETED | OUTPATIENT
Start: 2020-03-08 | End: 2020-03-08

## 2020-03-08 RX ADMIN — ALBUTEROL SULFATE 2.5 MG: 0.83 SOLUTION RESPIRATORY (INHALATION) at 03:03

## 2020-03-08 RX ADMIN — IPRATROPIUM BROMIDE 0.5 MG: 0.5 SOLUTION RESPIRATORY (INHALATION) at 03:03

## 2020-03-08 NOTE — PROGRESS NOTES
"Subjective:       Patient ID: Floridalma Romero is a 74 y.o. female.    Vitals:  height is 5' 7" (1.702 m) and weight is 54.4 kg (120 lb). Her oral temperature is 98.6 °F (37 °C). Her blood pressure is 143/83 (abnormal) and her pulse is 81. Her respiration is 16 and oxygen saturation is 98%.     Chief Complaint: URI    URI    This is a new problem. Episode onset: x4 days. The problem has been gradually worsening. There has been no fever. Associated symptoms include congestion, coughing and sneezing. Pertinent negatives include no ear pain, nausea, rash, sinus pain, sore throat, vomiting or wheezing. She has tried antihistamine and decongestant for the symptoms. The treatment provided no relief.       Constitution: Negative for chills, sweating, fatigue and fever.   HENT: Positive for congestion. Negative for ear pain, sinus pain, sinus pressure, sore throat and voice change.    Neck: Negative for painful lymph nodes.   Eyes: Negative for eye redness.   Respiratory: Positive for chest tightness, cough and sputum production. Negative for bloody sputum, COPD, shortness of breath, stridor, wheezing and asthma.    Gastrointestinal: Negative for nausea and vomiting.   Musculoskeletal: Negative for muscle ache.   Skin: Negative for rash.   Allergic/Immunologic: Positive for sneezing. Negative for seasonal allergies and asthma.   Hematologic/Lymphatic: Negative for swollen lymph nodes.       Objective:      Physical Exam   Constitutional: She is oriented to person, place, and time. She appears well-developed and well-nourished. She is cooperative.  Non-toxic appearance. She does not have a sickly appearance. She does not appear ill. No distress.   Pt calm and cooperative. NAD noted.   HENT:   Head: Normocephalic and atraumatic.   Right Ear: Hearing, tympanic membrane, external ear and ear canal normal.   Left Ear: Hearing, tympanic membrane, external ear and ear canal normal.   Nose: Mucosal edema and rhinorrhea present. " No nasal deformity. No epistaxis. Right sinus exhibits no maxillary sinus tenderness and no frontal sinus tenderness. Left sinus exhibits no maxillary sinus tenderness and no frontal sinus tenderness.   Mouth/Throat: Uvula is midline, oropharynx is clear and moist and mucous membranes are normal. No trismus in the jaw. Normal dentition. No uvula swelling. Cobblestoning present. No oropharyngeal exudate, posterior oropharyngeal edema or posterior oropharyngeal erythema. Tonsils are 1+ on the right. Tonsils are 1+ on the left. No tonsillar exudate.   Eyes: Conjunctivae and lids are normal. No scleral icterus.   Neck: Trachea normal, full passive range of motion without pain and phonation normal. Neck supple. No neck rigidity. No edema and no erythema present.   Cardiovascular: Normal rate, regular rhythm, normal heart sounds, intact distal pulses and normal pulses.   Pulmonary/Chest: Effort normal. No stridor. No respiratory distress. She has no decreased breath sounds. She has wheezes in the right upper field, the right middle field, the right lower field, the left upper field, the left middle field and the left lower field. She has no rhonchi. She has no rales. She exhibits tenderness.   NON PRODUCTIVE COUGH PRESENT THROUGHOUT EXAM. Deep inspiration causes coughing. Wheezing improved but not resolved following nebulizer treatment. Pt tolerated well, and feels as though she can take a deeper breath.       Abdominal: Normal appearance.   Musculoskeletal: Normal range of motion. She exhibits no edema or deformity.   Neurological: She is alert and oriented to person, place, and time. She exhibits normal muscle tone. Coordination normal.   Skin: Skin is warm, dry, intact, not diaphoretic and not pale.   Psychiatric: She has a normal mood and affect. Her speech is normal and behavior is normal. Judgment and thought content normal. Cognition and memory are normal.   Nursing note and vitals reviewed.      X-ray Chest Pa And  Lateral    Result Date: 3/8/2020  EXAMINATION: XR CHEST PA AND LATERAL CLINICAL HISTORY: Cough TECHNIQUE: PA and lateral views of the chest were performed. COMPARISON: 03/30/2019. FINDINGS: There are changes of median sternotomy.  The trachea is unremarkable.  There are calcifications of the aortic knob.  The cardiomediastinal silhouette is unchanged.  There is no evidence of free air beneath the hemidiaphragms.  There are no pleural effusions.  There is no evidence of a pneumothorax.  There is no evidence of pneumomediastinum.  No airspace opacity is present.  There are degenerative changes in the osseous structures.     No acute process. Electronically signed by: Lloyd Gtz MD Date:    03/08/2020 Time:    15:23  Assessment:       1. Purulent bronchitis    2. Wheezing    3. Cough        Plan:         Purulent bronchitis  -     doxycycline (VIBRAMYCIN) 100 MG Cap; Take 1 capsule (100 mg total) by mouth every 12 (twelve) hours. for 7 days  Dispense: 14 capsule; Refill: 0  -     predniSONE (DELTASONE) 20 MG tablet; Take 1 tablet (20 mg total) by mouth 2 (two) times daily. for 5 days  Dispense: 10 tablet; Refill: 0    Wheezing  -     albuterol nebulizer solution 2.5 mg  -     ipratropium 0.02 % nebulizer solution 0.5 mg  -     albuterol (VENTOLIN HFA) 90 mcg/actuation inhaler; Inhale 2 puffs into the lungs every 6 (six) hours as needed for Wheezing. Rescue  Dispense: 18 g; Refill: 0    Cough  -     X-Ray Chest PA And Lateral; Future; Expected date: 03/08/2020  -     benzonatate (TESSALON) 100 MG capsule; Take 1 capsule (100 mg total) by mouth 3 (three) times daily as needed.  Dispense: 30 capsule; Refill: 0  -     promethazine-dextromethorphan (PROMETHAZINE-DM) 6.25-15 mg/5 mL Syrp; Take 5 mLs by mouth every 4 to 6 hours as needed. 5 mL every 4 to 6 hours; maximum: 30 mL in 24 hours  Dispense: 118 mL; Refill: 0    Discussed xray findings, diagnosis, and treatment plan today. Instructed to follow up with PCP or go to  ER if symptoms fail to improve or worsen. Pt verbalizes understanding.       Patient Instructions       PLEASE READ YOUR DISCHARGE INSTRUCTIONS ENTIRELY AS IT CONTAINS IMPORTANT INFORMATION.      Please take your antibiotics and steroids to completion.     Use the albuterol inhaler for wheezing.     Take tessalon perle's as prescribed for cough (non-drowsy). DO NOT take at the same time as the cough syrup.    Do not drive while taking the cough syrup - best to take it at night before going to sleep. However, you can take it during the day (every 4-6 hours) if you do not have to drive or operate machinery. This medication will make you drowsy. Try taking half a dose first to see how it affects you.      Please return or see your primary care doctor if you develop new or worsening symptoms.     Please arrange follow up with your primary medical clinic as soon as possible. You must understand that you've received an Urgent Care treatment only and that you may be released before all of your medical problems are known or treated. You, the patient, will arrange for follow up as instructed. If your symptoms worsen or fail to improve you should go to the Emergency Room.      Bronchitis, Antibiotic Treatment (Adult)    Bronchitis is an infection of the air passages (bronchial tubes) in your lungs. It often occurs when you have a cold. This illness is contagious during the first few days and is spread through the air by coughing and sneezing, or by direct contact (touching the sick person and then touching your own eyes, nose, or mouth).  Symptoms of bronchitis include cough with mucus (phlegm) and low-grade fever. Bronchitis usually lasts 7 to 14 days. Mild cases can be treated with simple home remedies. More severe infection is treated with an antibiotic.  Home care  Follow these guidelines when caring for yourself at home:  · If your symptoms are severe, rest at home for the first 2 to 3 days. When you go back to your  usual activities, don't let yourself get too tired.  · Do not smoke. Also avoid being exposed to secondhand smoke.  · You may use over-the-counter medicines to control fever or pain, unless another medicine was prescribed. (Note: If you have chronic liver or kidney disease or have ever had a stomach ulcer or gastrointestinal bleeding, talk with your healthcare provider before using these medicines. Also talk to your provider if you are taking medicine to prevent blood clots.) Aspirin should never be given to anyone younger than 18 years of age who is ill with a viral infection or fever. It may cause severe liver or brain damage.  · Your appetite may be poor, so a light diet is fine. Avoid dehydration by drinking 6 to 8 glasses of fluids per day (such as water, soft drinks, sports drinks, juices, tea, or soup). Extra fluids will help loosen secretions in the nose and lungs.  · Over-the-counter cough, cold, and sore-throat medicines will not shorten the length of the illness, but they may be helpful to reduce symptoms. (Note: Do not use decongestants if you have high blood pressure.)  · Finish all antibiotic medicine. Do this even if you are feeling better after only a few days.  Follow-up care  Follow up with your healthcare provider, or as advised. If you had an X-ray or ECG (electrocardiogram), a specialist will review it. You will be notified of any new findings that may affect your care.  Note: If you are age 65 or older, or if you have a chronic lung disease or condition that affects your immune system, or you smoke, talk to your healthcare provider about having pneumococcal vaccinations and a yearly influenza vaccination (flu shot).  When to seek medical advice  Call your healthcare provider right away if any of these occur:  · Fever of 100.4°F (38°C) or higher  · Coughing up increased amounts of colored sputum  · Weakness, drowsiness, headache, facial pain, ear pain, or a stiff neck  Call 911, or get immediate  medical care  Contact emergency services right away if any of these occur.  · Coughing up blood  · Worsening weakness, drowsiness, headache, or stiff neck  · Trouble breathing, wheezing, or pain with breathing  Date Last Reviewed: 9/13/2015  © 5312-4994 [employer]. [address] [place] [address]. All rights reserved. This information is not intended as a substitute for professional medical care. Always follow your healthcare professional's instructions.

## 2020-03-08 NOTE — PATIENT INSTRUCTIONS
PLEASE READ YOUR DISCHARGE INSTRUCTIONS ENTIRELY AS IT CONTAINS IMPORTANT INFORMATION.      Please take your antibiotics and steroids to completion.     Use the albuterol inhaler for wheezing.     Take tessalon perle's as prescribed for cough (non-drowsy). DO NOT take at the same time as the cough syrup.    Do not drive while taking the cough syrup - best to take it at night before going to sleep. However, you can take it during the day (every 4-6 hours) if you do not have to drive or operate machinery. This medication will make you drowsy. Try taking half a dose first to see how it affects you.      Please return or see your primary care doctor if you develop new or worsening symptoms.     Please arrange follow up with your primary medical clinic as soon as possible. You must understand that you've received an Urgent Care treatment only and that you may be released before all of your medical problems are known or treated. You, the patient, will arrange for follow up as instructed. If your symptoms worsen or fail to improve you should go to the Emergency Room.      Bronchitis, Antibiotic Treatment (Adult)    Bronchitis is an infection of the air passages (bronchial tubes) in your lungs. It often occurs when you have a cold. This illness is contagious during the first few days and is spread through the air by coughing and sneezing, or by direct contact (touching the sick person and then touching your own eyes, nose, or mouth).  Symptoms of bronchitis include cough with mucus (phlegm) and low-grade fever. Bronchitis usually lasts 7 to 14 days. Mild cases can be treated with simple home remedies. More severe infection is treated with an antibiotic.  Home care  Follow these guidelines when caring for yourself at home:  · If your symptoms are severe, rest at home for the first 2 to 3 days. When you go back to your usual activities, don't let yourself get too tired.  · Do not smoke. Also avoid being exposed to  secondhand smoke.  · You may use over-the-counter medicines to control fever or pain, unless another medicine was prescribed. (Note: If you have chronic liver or kidney disease or have ever had a stomach ulcer or gastrointestinal bleeding, talk with your healthcare provider before using these medicines. Also talk to your provider if you are taking medicine to prevent blood clots.) Aspirin should never be given to anyone younger than 18 years of age who is ill with a viral infection or fever. It may cause severe liver or brain damage.  · Your appetite may be poor, so a light diet is fine. Avoid dehydration by drinking 6 to 8 glasses of fluids per day (such as water, soft drinks, sports drinks, juices, tea, or soup). Extra fluids will help loosen secretions in the nose and lungs.  · Over-the-counter cough, cold, and sore-throat medicines will not shorten the length of the illness, but they may be helpful to reduce symptoms. (Note: Do not use decongestants if you have high blood pressure.)  · Finish all antibiotic medicine. Do this even if you are feeling better after only a few days.  Follow-up care  Follow up with your healthcare provider, or as advised. If you had an X-ray or ECG (electrocardiogram), a specialist will review it. You will be notified of any new findings that may affect your care.  Note: If you are age 65 or older, or if you have a chronic lung disease or condition that affects your immune system, or you smoke, talk to your healthcare provider about having pneumococcal vaccinations and a yearly influenza vaccination (flu shot).  When to seek medical advice  Call your healthcare provider right away if any of these occur:  · Fever of 100.4°F (38°C) or higher  · Coughing up increased amounts of colored sputum  · Weakness, drowsiness, headache, facial pain, ear pain, or a stiff neck  Call 911, or get immediate medical care  Contact emergency services right away if any of these occur.  · Coughing up  blood  · Worsening weakness, drowsiness, headache, or stiff neck  · Trouble breathing, wheezing, or pain with breathing  Date Last Reviewed: 9/13/2015  © 2104-7558 [employer]. [address] [place] [address], [place] [address]. All rights reserved. This information is not intended as a substitute for professional medical care. Always follow your healthcare professional's instructions.

## 2023-02-02 ENCOUNTER — OFFICE VISIT (OUTPATIENT)
Dept: URGENT CARE | Facility: CLINIC | Age: 78
End: 2023-02-02
Payer: MEDICARE

## 2023-02-02 VITALS
OXYGEN SATURATION: 94 % | HEART RATE: 76 BPM | DIASTOLIC BLOOD PRESSURE: 76 MMHG | SYSTOLIC BLOOD PRESSURE: 130 MMHG | BODY MASS INDEX: 20.47 KG/M2 | HEIGHT: 64 IN | RESPIRATION RATE: 16 BRPM | WEIGHT: 119.94 LBS | TEMPERATURE: 99 F

## 2023-02-02 DIAGNOSIS — J20.8 ACUTE BRONCHITIS, BACTERIAL: Primary | ICD-10-CM

## 2023-02-02 DIAGNOSIS — R06.2 WHEEZE: ICD-10-CM

## 2023-02-02 DIAGNOSIS — B96.89 ACUTE BRONCHITIS, BACTERIAL: Primary | ICD-10-CM

## 2023-02-02 DIAGNOSIS — R09.02 HYPOXEMIA: ICD-10-CM

## 2023-02-02 LAB
CTP QC/QA: YES
CTP QC/QA: YES
POC MOLECULAR INFLUENZA A AGN: NEGATIVE
POC MOLECULAR INFLUENZA B AGN: NEGATIVE
SARS-COV-2 AG RESP QL IA.RAPID: NEGATIVE

## 2023-02-02 PROCEDURE — 87502 INFLUENZA DNA AMP PROBE: CPT | Mod: QW,S$GLB,, | Performed by: NURSE PRACTITIONER

## 2023-02-02 PROCEDURE — 71046 X-RAY EXAM CHEST 2 VIEWS: CPT | Mod: FY,S$GLB,, | Performed by: RADIOLOGY

## 2023-02-02 PROCEDURE — 87811 SARS CORONAVIRUS 2 ANTIGEN POCT, MANUAL READ: ICD-10-PCS | Mod: QW,S$GLB,, | Performed by: NURSE PRACTITIONER

## 2023-02-02 PROCEDURE — 94640 AIRWAY INHALATION TREATMENT: CPT | Mod: S$GLB,,, | Performed by: NURSE PRACTITIONER

## 2023-02-02 PROCEDURE — 99214 PR OFFICE/OUTPT VISIT, EST, LEVL IV, 30-39 MIN: ICD-10-PCS | Mod: 25,S$GLB,, | Performed by: NURSE PRACTITIONER

## 2023-02-02 PROCEDURE — 87811 SARS-COV-2 COVID19 W/OPTIC: CPT | Mod: QW,S$GLB,, | Performed by: NURSE PRACTITIONER

## 2023-02-02 PROCEDURE — 71046 XR CHEST PA AND LATERAL: ICD-10-PCS | Mod: FY,S$GLB,, | Performed by: RADIOLOGY

## 2023-02-02 PROCEDURE — 94640 PR INHAL RX, AIRWAY OBST/DX SPUTUM INDUCT: ICD-10-PCS | Mod: S$GLB,,, | Performed by: NURSE PRACTITIONER

## 2023-02-02 PROCEDURE — 99214 OFFICE O/P EST MOD 30 MIN: CPT | Mod: 25,S$GLB,, | Performed by: NURSE PRACTITIONER

## 2023-02-02 PROCEDURE — 87502 POCT INFLUENZA A/B MOLECULAR: ICD-10-PCS | Mod: QW,S$GLB,, | Performed by: NURSE PRACTITIONER

## 2023-02-02 RX ORDER — ALBUTEROL SULFATE 0.83 MG/ML
5 SOLUTION RESPIRATORY (INHALATION)
Status: COMPLETED | OUTPATIENT
Start: 2023-02-02 | End: 2023-02-02

## 2023-02-02 RX ORDER — IPRATROPIUM BROMIDE 0.5 MG/2.5ML
0.5 SOLUTION RESPIRATORY (INHALATION)
Status: COMPLETED | OUTPATIENT
Start: 2023-02-02 | End: 2023-02-02

## 2023-02-02 RX ORDER — BENZONATATE 200 MG/1
200 CAPSULE ORAL 3 TIMES DAILY PRN
Qty: 30 CAPSULE | Refills: 0 | Status: SHIPPED | OUTPATIENT
Start: 2023-02-02 | End: 2023-02-12

## 2023-02-02 RX ORDER — AMOXICILLIN AND CLAVULANATE POTASSIUM 875; 125 MG/1; MG/1
1 TABLET, FILM COATED ORAL EVERY 12 HOURS
Qty: 20 TABLET | Refills: 0 | Status: SHIPPED | OUTPATIENT
Start: 2023-02-02 | End: 2023-02-12

## 2023-02-02 RX ORDER — PREDNISONE 20 MG/1
20 TABLET ORAL DAILY
Qty: 5 TABLET | Refills: 0 | Status: SHIPPED | OUTPATIENT
Start: 2023-02-02 | End: 2023-02-07

## 2023-02-02 RX ADMIN — IPRATROPIUM BROMIDE 0.5 MG: 0.5 SOLUTION RESPIRATORY (INHALATION) at 01:02

## 2023-02-02 RX ADMIN — ALBUTEROL SULFATE 5 MG: 0.83 SOLUTION RESPIRATORY (INHALATION) at 01:02

## 2023-02-02 NOTE — PROGRESS NOTES
"Subjective:       Patient ID: Floridalma Romero is a 77 y.o. female.    Vitals:  height is 5' 4" (1.626 m) and weight is 54.4 kg (119 lb 14.9 oz). Her temperature is 98.8 °F (37.1 °C). Her blood pressure is 130/76 and her pulse is 76. Her respiration is 16 and oxygen saturation is 94% (abnormal).     Chief Complaint: Cough    77 year old female presents to  today with c/o cough, sob, wheeze, fatigue, and headache. Reports fever with temp of 102. Symptoms on/off with worsening yesterday. Denies chest pain. Denies n/v/d. Sa02 noted at 93%RA. Using aleve at home.     Cough  This is a new problem. The current episode started yesterday. The problem has been unchanged. The problem occurs constantly. The cough is Non-productive. Associated symptoms include a fever, shortness of breath and wheezing. Nothing aggravates the symptoms. Treatments tried: Aleve. The treatment provided no relief. Her past medical history is significant for bronchitis.     Constitution: Positive for fatigue and fever.   Respiratory:  Positive for chest tightness, cough, shortness of breath and wheezing.      Objective:      Physical Exam   Constitutional: She is oriented to person, place, and time. She appears well-developed. She is cooperative.  Non-toxic appearance. She does not appear ill. No distress.   HENT:   Head: Normocephalic.   Ears:   Right Ear: Hearing, tympanic membrane, external ear and ear canal normal.   Left Ear: Hearing, tympanic membrane, external ear and ear canal normal.   Nose: Nose normal. No mucosal edema, rhinorrhea or nasal deformity. No epistaxis. Right sinus exhibits no maxillary sinus tenderness and no frontal sinus tenderness. Left sinus exhibits no maxillary sinus tenderness and no frontal sinus tenderness.   Mouth/Throat: Uvula is midline and mucous membranes are normal. Mucous membranes are moist. No trismus in the jaw. Normal dentition. No uvula swelling. Posterior oropharyngeal erythema present. No " oropharyngeal exudate or posterior oropharyngeal edema. Oropharynx is clear.   Eyes: Lids are normal. No scleral icterus.   Neck: Trachea normal and phonation normal. Neck supple. No edema present. No erythema present. No neck rigidity present.   Cardiovascular: Normal rate and regular rhythm.   Pulmonary/Chest: Effort normal. No accessory muscle usage. No respiratory distress. She has decreased breath sounds. She has wheezes. She has no rhonchi. She exhibits no tenderness.   Abdominal: Normal appearance.   Musculoskeletal: Normal range of motion.         General: No deformity. Normal range of motion.   Lymphadenopathy:     She has no cervical adenopathy.   Neurological: She is alert and oriented to person, place, and time. She exhibits normal muscle tone. Coordination normal.   Skin: Skin is warm, dry, intact, not diaphoretic and not pale.   Psychiatric: Her speech is normal and behavior is normal. Judgment and thought content normal.   Nursing note and vitals reviewed.      Results for orders placed or performed in visit on 02/02/23   SARS Coronavirus 2 Antigen, POCT Manual Read   Result Value Ref Range    SARS Coronavirus 2 Antigen Negative Negative     Acceptable Yes    POCT Influenza A/B MOLECULAR   Result Value Ref Range    POC Molecular Influenza A Ag Negative Negative, Not Reported    POC Molecular Influenza B Ag Negative Negative, Not Reported     Acceptable Yes       XR CHEST PA AND LATERAL    Result Date: 2/2/2023  EXAMINATION: XR CHEST PA AND LATERAL CLINICAL HISTORY: Cough, unspecified TECHNIQUE: PA and lateral views of the chest were performed. COMPARISON: 03/08/2020. FINDINGS: Surgical changes are identified with sternal wires present.  The heart is not enlarged.  Atherosclerotic calcification is present within the aortic arch.  Pulmonary vasculature is within normal limits.  The lungs are free of focal consolidations.  There is no evidence for pneumothorax or large  pleural effusions.  Bony structures are grossly intact.     No acute chest disease identified.  No detrimental change noted when compared to 03/08/2020. Surgical changes. Electronically signed by: Owen Quarles MD Date:    02/02/2023 Time:    14:08    Assessment:       1. Acute bronchitis, bacterial    2. Wheeze    3. Hypoxemia          Plan:         Acute bronchitis, bacterial  -     SARS Coronavirus 2 Antigen, POCT Manual Read  -     XR CHEST PA AND LATERAL; Future; Expected date: 02/02/2023  -     POCT Influenza A/B MOLECULAR  -     albuterol nebulizer solution 5 mg  -     ipratropium 0.02 % nebulizer solution 0.5 mg  -     amoxicillin-clavulanate 875-125mg (AUGMENTIN) 875-125 mg per tablet; Take 1 tablet by mouth every 12 (twelve) hours. for 10 days  Dispense: 20 tablet; Refill: 0  -     predniSONE (DELTASONE) 20 MG tablet; Take 1 tablet (20 mg total) by mouth once daily. for 5 days  Dispense: 5 tablet; Refill: 0  -     benzonatate (TESSALON) 200 MG capsule; Take 1 capsule (200 mg total) by mouth 3 (three) times daily as needed for Cough.  Dispense: 30 capsule; Refill: 0    Wheeze  -     SARS Coronavirus 2 Antigen, POCT Manual Read  -     XR CHEST PA AND LATERAL; Future; Expected date: 02/02/2023  -     POCT Influenza A/B MOLECULAR  -     albuterol nebulizer solution 5 mg  -     ipratropium 0.02 % nebulizer solution 0.5 mg  -     predniSONE (DELTASONE) 20 MG tablet; Take 1 tablet (20 mg total) by mouth once daily. for 5 days  Dispense: 5 tablet; Refill: 0    Hypoxemia  -     SARS Coronavirus 2 Antigen, POCT Manual Read  -     XR CHEST PA AND LATERAL; Future; Expected date: 02/02/2023  -     POCT Influenza A/B MOLECULAR  -     albuterol nebulizer solution 5 mg  -     ipratropium 0.02 % nebulizer solution 0.5 mg  -     predniSONE (DELTASONE) 20 MG tablet; Take 1 tablet (20 mg total) by mouth once daily. for 5 days  Dispense: 5 tablet; Refill: 0         Patient Instructions   Oral fluids-increase water intake; hot  tea with honey for symptoms  Rest  Blow nose often  Avoid circulating air (such as ceiling fans) dries your airway  Avoid drinking cold drinks (worsens cough)  Therapeutic coughing to expel mucous  Sit in upright position often

## 2023-02-02 NOTE — PATIENT INSTRUCTIONS
Oral fluids-increase water intake; hot tea with honey for symptoms  Rest  Blow nose often  Avoid circulating air (such as ceiling fans) dries your airway  Avoid drinking cold drinks (worsens cough)  Therapeutic coughing to expel mucous  Sit in upright position often

## 2023-03-09 ENCOUNTER — OFFICE VISIT (OUTPATIENT)
Dept: URGENT CARE | Facility: CLINIC | Age: 78
End: 2023-03-09
Payer: MEDICARE

## 2023-03-09 VITALS
SYSTOLIC BLOOD PRESSURE: 127 MMHG | HEIGHT: 66 IN | HEART RATE: 96 BPM | DIASTOLIC BLOOD PRESSURE: 84 MMHG | TEMPERATURE: 98 F | WEIGHT: 116 LBS | BODY MASS INDEX: 18.64 KG/M2 | OXYGEN SATURATION: 93 % | RESPIRATION RATE: 18 BRPM

## 2023-03-09 DIAGNOSIS — R05.9 COUGH, UNSPECIFIED TYPE: ICD-10-CM

## 2023-03-09 DIAGNOSIS — U07.1 COVID-19: Primary | ICD-10-CM

## 2023-03-09 DIAGNOSIS — U07.1 COVID-19 VIRUS DETECTED: ICD-10-CM

## 2023-03-09 LAB
CTP QC/QA: YES
SARS-COV-2 AG RESP QL IA.RAPID: POSITIVE

## 2023-03-09 PROCEDURE — 99213 PR OFFICE/OUTPT VISIT, EST, LEVL III, 20-29 MIN: ICD-10-PCS | Mod: S$GLB,,,

## 2023-03-09 PROCEDURE — 87811 SARS CORONAVIRUS 2 ANTIGEN POCT, MANUAL READ: ICD-10-PCS | Mod: QW,S$GLB,,

## 2023-03-09 PROCEDURE — 99213 OFFICE O/P EST LOW 20 MIN: CPT | Mod: S$GLB,,,

## 2023-03-09 PROCEDURE — 87811 SARS-COV-2 COVID19 W/OPTIC: CPT | Mod: QW,S$GLB,,

## 2023-03-09 RX ORDER — ALBUTEROL SULFATE 90 UG/1
2 AEROSOL, METERED RESPIRATORY (INHALATION) EVERY 6 HOURS PRN
Qty: 18 G | Refills: 0 | Status: SHIPPED | OUTPATIENT
Start: 2023-03-09

## 2023-03-09 RX ORDER — DEXTROMETHORPHAN HYDROBROMIDE, GUAIFENESIN 5; 100 MG/5ML; MG/5ML
LIQUID ORAL
COMMUNITY

## 2023-03-09 RX ORDER — METOPROLOL SUCCINATE 50 MG/1
50 TABLET, EXTENDED RELEASE ORAL
COMMUNITY
Start: 2023-01-02

## 2023-03-09 NOTE — PROGRESS NOTES
"Subjective:       Patient ID: Floridalma Romero is a 77 y.o. female.    Vitals:  height is 5' 6" (1.676 m) and weight is 52.6 kg (116 lb). Her oral temperature is 98.3 °F (36.8 °C). Her blood pressure is 127/84 and her pulse is 96. Her respiration is 18 and oxygen saturation is 93% (abnormal).     Chief Complaint: Sinus Problem    Pt states she has had symptoms on and off for weeks. Pt states she was seen here on feb 2 and never gotten better and was recently seen at an urgent care in [place] on 3/2 and was prescribed medications (z pack and prednisone) but has not filled them, of note she did not have testing at that time. Pt has only took tylenol and has not had relief. Pt states symptoms are worsening with chest congestion and cough w/ phlegm. Pt came to evaluated and see what treatments could be done here for symptoms. Pt states she had exact symptoms in February and was diagnosed with bronchitis. Patient states her last fever was a day or 2 ago and it was 101.1. No tylenol this morning. Patient requesting albuterol inhaler because she hasn't had one in years.     Sinus Problem  This is a recurrent problem. The current episode started 1 to 4 weeks ago. The problem has been gradually worsening since onset. The maximum temperature recorded prior to her arrival was 101 - 101.9 F. The fever has been present for Less than 1 day. Her pain is at a severity of 0/10. She is experiencing no pain. Associated symptoms include chills, congestion, coughing, diaphoresis and headaches. Pertinent negatives include no ear pain, neck pain, sinus pressure or sore throat. Treatments tried: tylenol.     Constitution: Positive for appetite change, chills, sweating, fatigue and fever (resolved today).   HENT:  Positive for congestion. Negative for ear pain, ear discharge, postnasal drip, sinus pain, sinus pressure, sore throat, trouble swallowing and voice change.    Neck: Negative for neck pain and neck stiffness.   Cardiovascular: "  Negative for chest pain.   Eyes:  Negative for eye itching and eye redness.   Respiratory:  Positive for cough and sputum production. Negative for bloody sputum and wheezing.    Gastrointestinal:  Negative for nausea and vomiting.   Musculoskeletal:  Positive for muscle ache.   Neurological:  Positive for headaches.     Objective:      Vitals:    03/09/23 1614   BP: 127/84   Pulse: 96   Resp: 18   Temp: 98.3 °F (36.8 °C)       Physical Exam   Constitutional: She is oriented to person, place, and time. She appears well-developed. She is cooperative.  Non-toxic appearance. She does not appear ill. No distress.   HENT:   Head: Normocephalic and atraumatic.   Ears:   Right Ear: Hearing, tympanic membrane, external ear and ear canal normal. impacted cerumen  Left Ear: Hearing, tympanic membrane, external ear and ear canal normal. impacted cerumen  Nose: Nose normal. No mucosal edema, rhinorrhea or nasal deformity. No epistaxis. Right sinus exhibits no maxillary sinus tenderness and no frontal sinus tenderness. Left sinus exhibits no maxillary sinus tenderness and no frontal sinus tenderness.   Mouth/Throat: Uvula is midline, oropharynx is clear and moist and mucous membranes are normal. Mucous membranes are moist. No trismus in the jaw. Normal dentition. No uvula swelling. No oropharyngeal exudate, posterior oropharyngeal edema, posterior oropharyngeal erythema or cobblestoning. No tonsillar exudate.   Eyes: Conjunctivae and lids are normal. Pupils are equal, round, and reactive to light. Right eye exhibits no discharge. Left eye exhibits no discharge. No scleral icterus. Extraocular movement intact   Neck: Trachea normal and phonation normal. Neck supple. No edema present. No erythema present. No neck rigidity present.   Cardiovascular: Normal rate, regular rhythm, normal heart sounds and normal pulses.   Pulmonary/Chest: Effort normal and breath sounds normal. No respiratory distress. She has no decreased breath  sounds. She has no wheezes. She has no rhonchi. She has no rales.   Abdominal: Normal appearance.   Musculoskeletal: Normal range of motion.         General: No deformity. Normal range of motion.   Lymphadenopathy:     She has cervical adenopathy.   Neurological: She is alert and oriented to person, place, and time. She exhibits normal muscle tone. Coordination normal.   Skin: Skin is warm, dry, intact, not diaphoretic and not pale.   Psychiatric: Her speech is normal and behavior is normal. Judgment and thought content normal.   Nursing note and vitals reviewed.      Assessment:       1. COVID-19    2. Cough, unspecified type          Results for orders placed or performed in visit on 03/09/23   SARS Coronavirus 2 Antigen, POCT Manual Read   Result Value Ref Range    SARS Coronavirus 2 Antigen Positive (A) Negative     Acceptable Yes      COVID risk score: 4    Plan:         COVID-19  -     albuterol (PROVENTIL HFA) 90 mcg/actuation inhaler; Inhale 2 puffs into the lungs every 6 (six) hours as needed for Wheezing or Shortness of Breath. Rescue  Dispense: 18 g; Refill: 0    Cough, unspecified type  -     SARS Coronavirus 2 Antigen, POCT Manual Read              Patient Instructions   You have tested positive for COVID-19 today.      ISOLATION  If you tested positive and do not have symptoms, you must isolate for 5 days starting on the day of the positive test. I    If you tested positive and have symptoms, you must isolate for 5 days starting on the day of the first symptoms,  not the day of the positive test.     This is the most important part, both the CDC and the [facility] emphasize that you do not test out of isolation.     After 5 days, if your symptoms have improved and you have not had fever on day 5, you can return to the community on day 6- NO TESTING REQUIRED!      In fact, we do not retest if you were positive in the last 90 days.    After your 5 days of isolation are completed, the CDC  recommends strict mask use for the first 5 days that you come out of isolation. PLEASE FOLLOW CDC GUIDELINES REGARDING TRAVEL AFTER COVID INFECTION.    Return to Urgent Care or go to ER if symptoms worsen or fail to improve.  Follow up with PCP as recommended for further management.     Your symptoms should begin to improve by Day 5 of the infection-- if symptoms worsen, you develop a new fever, shortness of breath, worsening shortness of breath with activity, chest pain, worsening cough, you must return to Urgent Care or go to the ER.    PLEASE READ YOUR DISCHARGE INSTRUCTIONS ENTIRELY AS IT CONTAINS IMPORTANT INFORMATION.      Please drink plenty of fluids.    Please get plenty of rest.    Please return here or go to the Emergency Department for any concerns or worsening of condition.      Tylenol or ibuprofen can also be used as directed for pain and fever unless you have an allergy to them or medical condition such as stomach ulcers, kidney or liver disease or blood thinners etc for which you should not be taking these type of medications.   YOU CAN ALTERNATE TYLENOL AND IBUPROFEN EVERY 3-4 HOURS. Take 1000mg (2 pills) of Extra Strength Acetaminophen (Tylenol) every 6 hours and 600mg (3 pills) of Ibuprofen (Motrin/Advil) every 6 hours, alternating the two so that every 3 hours you take one or the other. Start with the Tylenol, then 3 hours later take the Ibuprofen, then 3 hours later take the Tylenol again, and so on.         For your allergy symptoms and/or runny nose, sinus/ear pressure, congestion:        - You can take over-the-counter claritin, zyrtec, allegra, or xyzal as directed. These are antihistamines that can help with runny nose, nasal congestion, sneezing, and helps to dry up post-nasal drip, which usually causes sore throat and cough.               - If you do NOT have high blood pressure, you may use a decongestant form (D)  of this medication (ie. Claritin- D, zyrtec-D, allegra-D) or if you do  not take the D form, you can take sudafed (pseudoephedrine) over the counter, which is a decongestant. Do NOT take two decongestant (D) medications at the same time (such as mucinex-D and claritin-D or plain sudafed and claritin D). Dextromethorphan (DM) is a cough suppressant over the counter (ie. mucinex DM, robitussin, delsym; dayquil/nyquil has DM as well.)    -If you DO have high blood pressure, AVOID DECONGESTANTS (I.e., Phenylephrine and Pseudoephedrine). You may take Coricidin HBP for sinus congestion and Delsym for cough.        - You can take plain Mucinex (guaifenesin) 1200 mg twice a day to help loosen mucous.       Use over the counter flonase or nasocort: one spray each nostril twice daily OR two sprays each nostril once daily until nares dry out, unless you have Glaucoma.   If you find this dries your nose out or your nose bleeds, try using over the counter nasal saline a few minutes prior to using the flonase to moisten the lining of your nose and throughout the day as needed.   Flonase/nasal spray use directions:  1) Use once per day.  2) Blow nose first.  3) Close one nostril and spray flonase up the other nostril while inhaling gently.   4) If you inhale to aggressively, you will have a bitter taste in the back of your mouth.       You can try breathe right strips at night to help you breathe.  A cool mist humidifier in bedroom may help with cough and relieve stuffy nose.     Sinus rinses DO NOT USE TAP WATER, if you must, water must be a rolling boil for 1 minute, let it cool, then use.  May use distilled water, or over the counter nasal saline rinses.  Vics vapor rub in shower to help open nasal passages.  May use nasal gel to keep passages moisturized.  May use Nasal saline sprays during the day for added relief of congestion.   For those who go to the gym, please do not use the sauna or steam room now to clear sinuses.      Sore throat recommendations: Warm fluids, warm salt water gargles,  throat lozenges, tea, honey, soup, rest, hydration.      Cough     Honey with ace to soothe your throat    Robitussin or Delsyum for cough suppressant for dry cough.    Mucinex DM or products containing Guaifenesin or Dextromethorphan for expectorant (wet cough).    Take prescription cough meds (pills) as prescribed; take prescription cough syrup at night as needed for cough.  Do not take both the prescribed cough pills and syrup at the same time or within 6 hours of each other.  Do not take the cough syrup with any other sedative medication as it can can cause drowsiness. Do not operate any heavy machinery, drink or drive while taking the cough syrup.    Try taking half a dose first of the cough syrup to see how it affects you.       Please follow up with your primary care doctor or specialist in the next 48-72hrs as needed and if no improvement    If you  smoke, please stop smoking.      Please return or see your primary care doctor if you develop new or worsening symptoms.     Please arrange follow up with your primary medical clinic as soon as possible. You must understand that you've received an Urgent Care treatment only and that you may be released before all of your medical problems are known or treated. You, the patient, will arrange for follow up as instructed. If your symptoms worsen or fail to improve you should go to the Emergency Room.

## 2023-03-09 NOTE — PATIENT INSTRUCTIONS
You have tested positive for COVID-19 today.      ISOLATION  If you tested positive and do not have symptoms, you must isolate for 5 days starting on the day of the positive test. I    If you tested positive and have symptoms, you must isolate for 5 days starting on the day of the first symptoms,  not the day of the positive test.     This is the most important part, both the CDC and the [facility] emphasize that you do not test out of isolation.     After 5 days, if your symptoms have improved and you have not had fever on day 5, you can return to the community on day 6- NO TESTING REQUIRED!      In fact, we do not retest if you were positive in the last 90 days.    After your 5 days of isolation are completed, the CDC recommends strict mask use for the first 5 days that you come out of isolation. PLEASE FOLLOW CDC GUIDELINES REGARDING TRAVEL AFTER COVID INFECTION.    Return to Urgent Care or go to ER if symptoms worsen or fail to improve.  Follow up with PCP as recommended for further management.     Your symptoms should begin to improve by Day 5 of the infection-- if symptoms worsen, you develop a new fever, shortness of breath, worsening shortness of breath with activity, chest pain, worsening cough, you must return to Urgent Care or go to the ER.    PLEASE READ YOUR DISCHARGE INSTRUCTIONS ENTIRELY AS IT CONTAINS IMPORTANT INFORMATION.      Please drink plenty of fluids.    Please get plenty of rest.    Please return here or go to the Emergency Department for any concerns or worsening of condition.      Tylenol or ibuprofen can also be used as directed for pain and fever unless you have an allergy to them or medical condition such as stomach ulcers, kidney or liver disease or blood thinners etc for which you should not be taking these type of medications.   YOU CAN ALTERNATE TYLENOL AND IBUPROFEN EVERY 3-4 HOURS. Take 1000mg (2 pills) of Extra Strength Acetaminophen (Tylenol) every 6 hours and 600mg (3 pills) of  Ibuprofen (Motrin/Advil) every 6 hours, alternating the two so that every 3 hours you take one or the other. Start with the Tylenol, then 3 hours later take the Ibuprofen, then 3 hours later take the Tylenol again, and so on.         For your allergy symptoms and/or runny nose, sinus/ear pressure, congestion:        - You can take over-the-counter claritin, zyrtec, allegra, or xyzal as directed. These are antihistamines that can help with runny nose, nasal congestion, sneezing, and helps to dry up post-nasal drip, which usually causes sore throat and cough.               - If you do NOT have high blood pressure, you may use a decongestant form (D)  of this medication (ie. Claritin- D, zyrtec-D, allegra-D) or if you do not take the D form, you can take sudafed (pseudoephedrine) over the counter, which is a decongestant. Do NOT take two decongestant (D) medications at the same time (such as mucinex-D and claritin-D or plain sudafed and claritin D). Dextromethorphan (DM) is a cough suppressant over the counter (ie. mucinex DM, robitussin, delsym; dayquil/nyquil has DM as well.)    -If you DO have high blood pressure, AVOID DECONGESTANTS (I.e., Phenylephrine and Pseudoephedrine). You may take Coricidin HBP for sinus congestion and Delsym for cough.        - You can take plain Mucinex (guaifenesin) 1200 mg twice a day to help loosen mucous.       Use over the counter flonase or nasocort: one spray each nostril twice daily OR two sprays each nostril once daily until nares dry out, unless you have Glaucoma.   If you find this dries your nose out or your nose bleeds, try using over the counter nasal saline a few minutes prior to using the flonase to moisten the lining of your nose and throughout the day as needed.   Flonase/nasal spray use directions:  1) Use once per day.  2) Blow nose first.  3) Close one nostril and spray flonase up the other nostril while inhaling gently.   4) If you inhale to aggressively, you will  have a bitter taste in the back of your mouth.       You can try breathe right strips at night to help you breathe.  A cool mist humidifier in bedroom may help with cough and relieve stuffy nose.     Sinus rinses DO NOT USE TAP WATER, if you must, water must be a rolling boil for 1 minute, let it cool, then use.  May use distilled water, or over the counter nasal saline rinses.  Vics vapor rub in shower to help open nasal passages.  May use nasal gel to keep passages moisturized.  May use Nasal saline sprays during the day for added relief of congestion.   For those who go to the gym, please do not use the sauna or steam room now to clear sinuses.      Sore throat recommendations: Warm fluids, warm salt water gargles, throat lozenges, tea, honey, soup, rest, hydration.      Cough     Honey with ace to soothe your throat    Robitussin or Delsyum for cough suppressant for dry cough.    Mucinex DM or products containing Guaifenesin or Dextromethorphan for expectorant (wet cough).    Take prescription cough meds (pills) as prescribed; take prescription cough syrup at night as needed for cough.  Do not take both the prescribed cough pills and syrup at the same time or within 6 hours of each other.  Do not take the cough syrup with any other sedative medication as it can can cause drowsiness. Do not operate any heavy machinery, drink or drive while taking the cough syrup.    Try taking half a dose first of the cough syrup to see how it affects you.       Please follow up with your primary care doctor or specialist in the next 48-72hrs as needed and if no improvement    If you  smoke, please stop smoking.      Please return or see your primary care doctor if you develop new or worsening symptoms.     Please arrange follow up with your primary medical clinic as soon as possible. You must understand that you've received an Urgent Care treatment only and that you may be released before all of your medical problems are  known or treated. You, the patient, will arrange for follow up as instructed. If your symptoms worsen or fail to improve you should go to the Emergency Room.

## 2023-05-24 ENCOUNTER — OFFICE VISIT (OUTPATIENT)
Dept: URGENT CARE | Facility: CLINIC | Age: 78
End: 2023-05-24
Payer: MEDICARE

## 2023-05-24 VITALS
WEIGHT: 116 LBS | TEMPERATURE: 98 F | HEIGHT: 66 IN | OXYGEN SATURATION: 95 % | RESPIRATION RATE: 18 BRPM | DIASTOLIC BLOOD PRESSURE: 86 MMHG | SYSTOLIC BLOOD PRESSURE: 143 MMHG | BODY MASS INDEX: 18.64 KG/M2 | HEART RATE: 63 BPM

## 2023-05-24 DIAGNOSIS — F17.200 SMOKER: ICD-10-CM

## 2023-05-24 DIAGNOSIS — J06.9 VIRAL URI WITH COUGH: Primary | ICD-10-CM

## 2023-05-24 DIAGNOSIS — R05.2 SUBACUTE COUGH: ICD-10-CM

## 2023-05-24 LAB
CTP QC/QA: YES
SARS-COV-2 AG RESP QL IA.RAPID: NEGATIVE

## 2023-05-24 PROCEDURE — 99213 PR OFFICE/OUTPT VISIT, EST, LEVL III, 20-29 MIN: ICD-10-PCS | Mod: S$GLB,,, | Performed by: FAMILY MEDICINE

## 2023-05-24 PROCEDURE — 71046 X-RAY EXAM CHEST 2 VIEWS: CPT | Mod: FY,S$GLB,, | Performed by: RADIOLOGY

## 2023-05-24 PROCEDURE — 71046 XR CHEST PA AND LATERAL: ICD-10-PCS | Mod: FY,S$GLB,, | Performed by: RADIOLOGY

## 2023-05-24 PROCEDURE — 87811 SARS CORONAVIRUS 2 ANTIGEN POCT, MANUAL READ: ICD-10-PCS | Mod: QW,S$GLB,, | Performed by: FAMILY MEDICINE

## 2023-05-24 PROCEDURE — 87811 SARS-COV-2 COVID19 W/OPTIC: CPT | Mod: QW,S$GLB,, | Performed by: FAMILY MEDICINE

## 2023-05-24 PROCEDURE — 99213 OFFICE O/P EST LOW 20 MIN: CPT | Mod: S$GLB,,, | Performed by: FAMILY MEDICINE

## 2023-05-24 RX ORDER — FLUTICASONE PROPIONATE 50 MCG
1 SPRAY, SUSPENSION (ML) NASAL DAILY
Qty: 16 G | Refills: 0 | Status: SHIPPED | OUTPATIENT
Start: 2023-05-24

## 2023-05-24 RX ORDER — BENZONATATE 100 MG/1
100 CAPSULE ORAL 3 TIMES DAILY PRN
Qty: 30 CAPSULE | Refills: 0 | Status: SHIPPED | OUTPATIENT
Start: 2023-05-24 | End: 2023-06-03

## 2023-05-24 RX ORDER — ALBUTEROL SULFATE 90 UG/1
2 AEROSOL, METERED RESPIRATORY (INHALATION) EVERY 6 HOURS PRN
Qty: 18 G | Refills: 0 | Status: SHIPPED | OUTPATIENT
Start: 2023-05-24 | End: 2023-06-23

## 2023-05-24 RX ORDER — CETIRIZINE HYDROCHLORIDE 10 MG/1
10 TABLET ORAL DAILY
Qty: 30 TABLET | Refills: 0 | Status: SHIPPED | OUTPATIENT
Start: 2023-05-24 | End: 2023-06-23

## 2023-05-24 RX ORDER — GUAIFENESIN 600 MG/1
1200 TABLET, EXTENDED RELEASE ORAL 2 TIMES DAILY
Qty: 40 TABLET | Refills: 0 | Status: SHIPPED | OUTPATIENT
Start: 2023-05-24 | End: 2023-06-03

## 2023-05-24 NOTE — PATIENT INSTRUCTIONS
General Discharge Instructions   PLEASE READ YOUR DISCHARGE INSTRUCTIONS ENTIRELY AS IT CONTAINS IMPORTANT INFORMATION.  If you were prescribed a narcotic or controlled medication, do not drive or operate heavy equipment or machinery while taking these medications.  If you were prescribed antibiotics, please take them to completion.  You must understand that you've received an Urgent Care treatment only and that you may be released before all your medical problems are known or treated. You, the patient, will arrange for follow up care as instructed.    OVER THE COUNTER RECOMMENDATIONS/SUGGESTIONS.    Make sure to stay well hydrated.    Use Nasal Saline to mechanically move any post nasal drip from your eustachian tube or from the back of your throat.    Use warm salt water gargles to ease your throat pain. Warm salt water gargles as needed for sore throat- 1/2 tsp salt to 1 cup warm water, gargle as desired.    Use an antihistamine such as Claritin, Zyrtec or Allegra to dry you out.    Use pseudoephedrine (behind the counter) to decongest. Pseudoephedrine 30 mg up to 240 mg /day. It can raise your blood pressure and give you palpitations.    Use mucinex (guaifenesin) to break up mucous up to 2400mg/day to loosen any mucous.    The mucinex DM pill has a cough suppressant that can be sedating. It can be used at night to stop the tickle at the back of your throat.    You can use Mucinex D (it has guaifenesin and a high dose of pseudoephedrine) in the mornings to help decongest.    Use Afrin in each nare for no longer than 3 days, as it is addictive. It can also dry out your mucous membranes and cause elevated blood pressure. This is especially useful if you are flying.    Use Flonase 1-2 sprays/nostril per day. It is a local acting steroid nasal spray, if you develop a bloody nose, stop using the medication immediately.    Sometimes Nyquil at night is beneficial to help you get some rest, however it is sedating and it  does have an antihistamine, and tylenol.    Honey is a natural cough suppressant that can be used.    Tylenol up to 4,000 mg a day is safe for short periods and can be used for body aches, pain, and fever. However in high doses and prolonged use it can cause liver irritation.    Ibuprofen is a non-steroidal anti-inflammatory that can be used for body aches, pain, and fever.However it can also cause stomach irritation if over used.     Follow up with your PCP or specialty clinic as instructed in the next 2-3 days if not improved or as needed. You can call [phone] to schedule an appointment with appropriate provider.      If you condition worsens, we recommend that you receive another evaluation at the emergency room immediately or contact your primary medical clinic's after hours call service to discuss your concerns.      Please return here or go to the Emergency Department for any concerns or worsening condition.   You can also call [phone] to schedule an appointment with the appropriate provider.    Please return here or go to the Emergency Department for any concerns or worsening of condition.    Thank you for choosing [facility]!    Our goal in the Urgent Care is to always provide outstanding medical care. You may receive a survey by mail or e-mail in the next week regarding your experience today. We would greatly appreciate you completing and returning the survey. Your feedback provides us with a way to recognize our staff who provide very good care, and it helps us learn how to improve when your experience was below our aspiration of excellence.      We appreciate you trusting us with your medical care. We hope you feel better soon. We will be happy to take care of you for all of your future medical needs.    Sincerely,    NAYLA Isaac  Patient Instructions   PLEASE READ YOUR DISCHARGE INSTRUCTIONS ENTIRELY AS IT CONTAINS IMPORTANT INFORMATION.        Please drink plenty of  "fluids. You body needs increased water but other beverages may aid in comfort.  You will know that you have had enough water to be hydrated when your urine is clear or at least a very pale yellow. Nasal saline may help with removal of mucus as well.  Ibuprofen is preferred for aches and pains as well as fever reduction. If you do not have high blood pressure, then you may use a decongestant such as pseudoephedrine or one of the above medications that have the letter, "-D" following it.  Hot tea with honey can help with sore throats as the heat with reduce the inflammation and the honey will coat your throat to help it feel better. Prescription pain medicine should be used for the significant throat sxs you are experiencing. Do not drive after taking this medication.     Lastly, good hand washing and cough hygiene (cough into your elbow) will help prevent the spread of the illness.  A general rule is that you are no longer contagious once you have been without a fever for over 24 hours without requiring fever reducing medications.   Please get plenty of rest.     Please return here or go to the Emergency Department for any concerns or worsening of condition.     Please take an over the counter antihistamine medication (allegra/Claritin/Zyrtec) of your choice as directed, they may help with some of the runny nose symptoms if you are having them.       Can continue mucinex. Use mucinex (guaifenisin) to break up mucous up to 2400mg/day to loosen any mucous. The mucinex DM pill has a cough suppressant that can be used at night to stop the tickle at the back of your throat. You may use Mucinex to help thin thick secretions to allow you to expel them but it only works if you drink more water.     If not allergic, please take over the counter Tylenol (Acetaminophen) and/or Motrin (Ibuprofen) as directed for control of pain and/or fever.  Please follow up with your primary care doctor or specialist as needed.     Sore throat " recommendations: Warm fluids, warm salt water gargles, throat lozenges, tea, honey, soup, rest, hydration.     Use over the counter flonase: one spray each nostril twice daily OR two sprays each nostril once daily.      Sinus rinses DO NOT USE TAP WATER, if you must, water must be a rolling boil for 1 minute, let it cool, then use.  May use distilled water, or over the counter nasal saline rinses.  Vics vapor rub in shower to help open nasal passages.  May use nasal gel to keep passages moisturized.  May use Nasal saline sprays during the day for added relief of congestion.   For those who go to the gym, please do not use the sauna or steam room now to clear sinuses.     If you  smoke, please stop smoking.        Please return or see your primary care doctor if you develop new or worsening symptoms.      Please arrange follow up with your primary medical clinic as soon as possible. You must understand that you've received an Urgent Care treatment only and that you may be released before all of your medical problems are known or treated. You, the patient, will arrange for follow up as instructed. If your symptoms worsen or fail to improve you should go to the Emergency Room.

## 2023-05-24 NOTE — PROGRESS NOTES
"Subjective:      Patient ID: Floridalma Romero is a 77 y.o. female.    Vitals:  height is 5' 6" (1.676 m) and weight is 52.6 kg (116 lb). Her temperature is 97.9 °F (36.6 °C). Her blood pressure is 143/86 (abnormal) and her pulse is 63. Her respiration is 18 and oxygen saturation is 95%.     Chief Complaint: Cough    Pt complains x6 days of cough, chest congestion, post nasal drip, left earache pressure. Took tylenol with mild relief.   Provider note begins below:  Past Medical History:  No date: Coronary artery disease      Comment:  Dr. Tolliver  No date: Hyperlipidemia  No date: OP (osteoporosis)  No date: Osteoarthritis   Pt with above pmh presents with c/o cough x 8 days, she says she gets bronchitis. She was dx with covid in march, since then she says she will still have some fatigue and brain fog, and a cough comes and goes, but more so in the last 8 days. She is a former smoker. She has not taken any cough medication. She has albuterol and uses that and has relief with that. She sleeps elevated but that is how she always sleeps, unchanged, denies any swelling. She is daily smoker.     Cough  This is a new problem. The current episode started in the past 7 days. The problem has been unchanged. The problem occurs constantly. The cough is Non-productive. Associated symptoms include headaches, nasal congestion, postnasal drip and shortness of breath (ongoing, unchanged, with exertion.). Pertinent negatives include no chest pain, chills, ear congestion, ear pain, fever, heartburn, hemoptysis, myalgias, rash, rhinorrhea, sore throat, sweats, weight loss or wheezing. Treatments tried: tylenol. Her past medical history is significant for bronchitis and environmental allergies. There is no history of asthma, bronchiectasis, COPD, emphysema or pneumonia.     Constitution: Negative for activity change, appetite change, chills, sweating, fatigue, fever, unexpected weight change and generalized weakness.   HENT:  " Positive for postnasal drip. Negative for ear pain, sinus pain, sinus pressure, sore throat, trouble swallowing and voice change.    Cardiovascular:  Negative for chest pain, leg swelling, palpitations and sob on exertion.   Respiratory:  Positive for cough and shortness of breath (ongoing, unchanged, with exertion.). Negative for chest tightness, sputum production, bloody sputum, COPD, stridor, wheezing and asthma.    Gastrointestinal:  Negative for heartburn.   Musculoskeletal:  Negative for muscle ache.   Skin:  Negative for rash.   Allergic/Immunologic: Positive for environmental allergies and seasonal allergies. Negative for asthma.   Neurological:  Positive for headaches.    Objective:     Physical Exam   Constitutional: She is oriented to person, place, and time. She appears well-developed.  Non-toxic appearance. She does not appear ill. No distress.   HENT:   Head: Normocephalic and atraumatic.   Ears:   Right Ear: External ear normal.   Left Ear: External ear normal.   Nose: Nose normal.   Mouth/Throat: Oropharynx is clear and moist.   Eyes: Conjunctivae, EOM and lids are normal. Pupils are equal, round, and reactive to light.   Neck: Trachea normal and phonation normal. Neck supple.   Pulmonary/Chest: Effort normal. No respiratory distress. She has wheezes in the right upper field and the left upper field.   Musculoskeletal: Normal range of motion.         General: Normal range of motion.   Neurological: She is alert and oriented to person, place, and time.   Skin: Skin is warm, dry, intact and not diaphoretic.   Psychiatric: Her speech is normal and behavior is normal. Judgment and thought content normal.   Nursing note and vitals reviewed.    Assessment:     1. Viral URI with cough    2. Subacute cough    3. Smoker      Results for orders placed or performed in visit on 05/24/23   SARS Coronavirus 2 Antigen, POCT Manual Read   Result Value Ref Range    SARS Coronavirus 2 Antigen Negative Negative      Acceptable Yes       XR CHEST PA AND LATERAL    Result Date: 5/24/2023  EXAMINATION: XR CHEST PA AND LATERAL CLINICAL HISTORY: Subacute cough TECHNIQUE: PA and lateral views of the chest were performed. COMPARISON: February 2, 2023 FINDINGS: Stable sternotomy changes, normal heart size, tortuous thoracic aorta and arch calcification.  Normal pulmonary vasculature.  Intervally developed the minimal blunting of right costophrenic sulcus that could relate to trace amount of fluid.  No definite pleural fluid blunting left costophrenic sulcus.  No focal infiltrates.  No pneumothorax.  Mild scoliosis.  Degenerative changes spine.     As above Electronically signed by: Rich Delgado Date:    05/24/2023 Time:    14:55     Plan:     Advised to fu with us 4-5 days if no improvement  Otc meds reviewed, pt in nad  Advised smoking cessation - discussed our free clinic, risks of smoking, and common practices used to quit. Discussion - 5 minutes.     Discussed results/diagnosis/plan with patient in clinic. Strict precautions given to patient to monitor for worsening signs and symptoms. Advised to follow up with PCP or specialist.    Explained side effects of medications prescribed with patient and informed him/her to discontinue use if he/she has any side effects and to inform UC or PCP if this occurs. All questions answered. Strict ED verses clinic return precautions stressed and given in depth. Advised if symptoms worsens of fail to improve he/she should go to the Emergency Room. Discharge and follow-up instructions given verbally/printed with the patient who expressed understanding and willingness to comply with my recommendations. Patient voiced understanding and in agreement with current treatment plan. Patient exits the exam room in no acute distress. Conversant and engaged during discharge discussion, verbalized understanding.      Viral URI with cough  -     albuterol (VENTOLIN HFA) 90 mcg/actuation inhaler;  Inhale 2 puffs into the lungs every 6 (six) hours as needed for Wheezing. Rescue  Dispense: 18 g; Refill: 0  -     guaiFENesin (MUCINEX) 600 mg 12 hr tablet; Take 2 tablets (1,200 mg total) by mouth 2 (two) times daily. for 10 days  Dispense: 40 tablet; Refill: 0  -     cetirizine (ZYRTEC) 10 MG tablet; Take 1 tablet (10 mg total) by mouth once daily.  Dispense: 30 tablet; Refill: 0  -     fluticasone propionate (FLONASE) 50 mcg/actuation nasal spray; 1 spray (50 mcg total) by Each Nostril route once daily.  Dispense: 16 g; Refill: 0  -     benzonatate (TESSALON) 100 MG capsule; Take 1 capsule (100 mg total) by mouth 3 (three) times daily as needed for Cough.  Dispense: 30 capsule; Refill: 0    Subacute cough  -     SARS Coronavirus 2 Antigen, POCT Manual Read  -     XR CHEST PA AND LATERAL; Future; Expected date: 05/24/2023    Smoker  -     Ambulatory referral/consult to Smoking Cessation Program             Additional MDM:     Heart Failure Score:   COPD = No    Patient Instructions   General Discharge Instructions   PLEASE READ YOUR DISCHARGE INSTRUCTIONS ENTIRELY AS IT CONTAINS IMPORTANT INFORMATION.  If you were prescribed a narcotic or controlled medication, do not drive or operate heavy equipment or machinery while taking these medications.  If you were prescribed antibiotics, please take them to completion.  You must understand that you've received an Urgent Care treatment only and that you may be released before all your medical problems are known or treated. You, the patient, will arrange for follow up care as instructed.    OVER THE COUNTER RECOMMENDATIONS/SUGGESTIONS.    Make sure to stay well hydrated.    Use Nasal Saline to mechanically move any post nasal drip from your eustachian tube or from the back of your throat.    Use warm salt water gargles to ease your throat pain. Warm salt water gargles as needed for sore throat- 1/2 tsp salt to 1 cup warm water, gargle as desired.    Use an antihistamine  such as Claritin, Zyrtec or Allegra to dry you out.    Use pseudoephedrine (behind the counter) to decongest. Pseudoephedrine 30 mg up to 240 mg /day. It can raise your blood pressure and give you palpitations.    Use mucinex (guaifenesin) to break up mucous up to 2400mg/day to loosen any mucous.    The mucinex DM pill has a cough suppressant that can be sedating. It can be used at night to stop the tickle at the back of your throat.    You can use Mucinex D (it has guaifenesin and a high dose of pseudoephedrine) in the mornings to help decongest.    Use Afrin in each nare for no longer than 3 days, as it is addictive. It can also dry out your mucous membranes and cause elevated blood pressure. This is especially useful if you are flying.    Use Flonase 1-2 sprays/nostril per day. It is a local acting steroid nasal spray, if you develop a bloody nose, stop using the medication immediately.    Sometimes Nyquil at night is beneficial to help you get some rest, however it is sedating and it does have an antihistamine, and tylenol.    Honey is a natural cough suppressant that can be used.    Tylenol up to 4,000 mg a day is safe for short periods and can be used for body aches, pain, and fever. However in high doses and prolonged use it can cause liver irritation.    Ibuprofen is a non-steroidal anti-inflammatory that can be used for body aches, pain, and fever.However it can also cause stomach irritation if over used.     Follow up with your PCP or specialty clinic as instructed in the next 2-3 days if not improved or as needed. You can call [phone] to schedule an appointment with appropriate provider.      If you condition worsens, we recommend that you receive another evaluation at the emergency room immediately or contact your primary medical clinic's after hours call service to discuss your concerns.      Please return here or go to the Emergency Department for any concerns or worsening condition.   You can  "also call [phone] to schedule an appointment with the appropriate provider.    Please return here or go to the Emergency Department for any concerns or worsening of condition.    Thank you for choosing [facility]!    Our goal in the Urgent Care is to always provide outstanding medical care. You may receive a survey by mail or e-mail in the next week regarding your experience today. We would greatly appreciate you completing and returning the survey. Your feedback provides us with a way to recognize our staff who provide very good care, and it helps us learn how to improve when your experience was below our aspiration of excellence.      We appreciate you trusting us with your medical care. We hope you feel better soon. We will be happy to take care of you for all of your future medical needs.    Sincerely,    NAYLA Isaac  Patient Instructions   PLEASE READ YOUR DISCHARGE INSTRUCTIONS ENTIRELY AS IT CONTAINS IMPORTANT INFORMATION.        Please drink plenty of fluids. You body needs increased water but other beverages may aid in comfort.  You will know that you have had enough water to be hydrated when your urine is clear or at least a very pale yellow. Nasal saline may help with removal of mucus as well.  Ibuprofen is preferred for aches and pains as well as fever reduction. If you do not have high blood pressure, then you may use a decongestant such as pseudoephedrine or one of the above medications that have the letter, "-D" following it.  Hot tea with honey can help with sore throats as the heat with reduce the inflammation and the honey will coat your throat to help it feel better. Prescription pain medicine should be used for the significant throat sxs you are experiencing. Do not drive after taking this medication.     Lastly, good hand washing and cough hygiene (cough into your elbow) will help prevent the spread of the illness.  A general rule is that you are no longer contagious " once you have been without a fever for over 24 hours without requiring fever reducing medications.   Please get plenty of rest.     Please return here or go to the Emergency Department for any concerns or worsening of condition.     Please take an over the counter antihistamine medication (allegra/Claritin/Zyrtec) of your choice as directed, they may help with some of the runny nose symptoms if you are having them.       Can continue mucinex. Use mucinex (guaifenisin) to break up mucous up to 2400mg/day to loosen any mucous. The mucinex DM pill has a cough suppressant that can be used at night to stop the tickle at the back of your throat. You may use Mucinex to help thin thick secretions to allow you to expel them but it only works if you drink more water.     If not allergic, please take over the counter Tylenol (Acetaminophen) and/or Motrin (Ibuprofen) as directed for control of pain and/or fever.  Please follow up with your primary care doctor or specialist as needed.     Sore throat recommendations: Warm fluids, warm salt water gargles, throat lozenges, tea, honey, soup, rest, hydration.     Use over the counter flonase: one spray each nostril twice daily OR two sprays each nostril once daily.      Sinus rinses DO NOT USE TAP WATER, if you must, water must be a rolling boil for 1 minute, let it cool, then use.  May use distilled water, or over the counter nasal saline rinses.  Vics vapor rub in shower to help open nasal passages.  May use nasal gel to keep passages moisturized.  May use Nasal saline sprays during the day for added relief of congestion.   For those who go to the gym, please do not use the sauna or steam room now to clear sinuses.     If you  smoke, please stop smoking.        Please return or see your primary care doctor if you develop new or worsening symptoms.      Please arrange follow up with your primary medical clinic as soon as possible. You must understand that you've received an Urgent  Care treatment only and that you may be released before all of your medical problems are known or treated. You, the patient, will arrange for follow up as instructed. If your symptoms worsen or fail to improve you should go to the Emergency Room.

## 2023-05-30 ENCOUNTER — OFFICE VISIT (OUTPATIENT)
Dept: URGENT CARE | Facility: CLINIC | Age: 78
End: 2023-05-30
Payer: MEDICARE

## 2023-05-30 VITALS
TEMPERATURE: 98 F | OXYGEN SATURATION: 95 % | DIASTOLIC BLOOD PRESSURE: 92 MMHG | HEIGHT: 66 IN | SYSTOLIC BLOOD PRESSURE: 163 MMHG | BODY MASS INDEX: 18.64 KG/M2 | WEIGHT: 116 LBS | HEART RATE: 69 BPM | RESPIRATION RATE: 18 BRPM

## 2023-05-30 DIAGNOSIS — R06.2 WHEEZING: ICD-10-CM

## 2023-05-30 DIAGNOSIS — R06.02 SHORTNESS OF BREATH: ICD-10-CM

## 2023-05-30 DIAGNOSIS — J44.1 COPD EXACERBATION: Primary | ICD-10-CM

## 2023-05-30 PROBLEM — F17.200 CURRENT SMOKER: Status: ACTIVE | Noted: 2023-05-30

## 2023-05-30 PROBLEM — I25.10 CHRONIC CORONARY ARTERY DISEASE: Status: ACTIVE | Noted: 2022-03-24

## 2023-05-30 PROBLEM — Z95.1 HISTORY OF CORONARY ARTERY BYPASS SURGERY: Status: ACTIVE | Noted: 2022-03-24

## 2023-05-30 PROCEDURE — 71046 XR CHEST PA AND LATERAL: ICD-10-PCS | Mod: FY,S$GLB,, | Performed by: RADIOLOGY

## 2023-05-30 PROCEDURE — 99214 OFFICE O/P EST MOD 30 MIN: CPT | Mod: 25,S$GLB,,

## 2023-05-30 PROCEDURE — 94640 PR INHAL RX, AIRWAY OBST/DX SPUTUM INDUCT: ICD-10-PCS | Mod: S$GLB,,,

## 2023-05-30 PROCEDURE — 99214 PR OFFICE/OUTPT VISIT, EST, LEVL IV, 30-39 MIN: ICD-10-PCS | Mod: 25,S$GLB,,

## 2023-05-30 PROCEDURE — 94640 AIRWAY INHALATION TREATMENT: CPT | Mod: S$GLB,,,

## 2023-05-30 PROCEDURE — 71046 X-RAY EXAM CHEST 2 VIEWS: CPT | Mod: FY,S$GLB,, | Performed by: RADIOLOGY

## 2023-05-30 RX ORDER — LEVALBUTEROL INHALATION SOLUTION 1.25 MG/3ML
1.25 SOLUTION RESPIRATORY (INHALATION)
Status: COMPLETED | OUTPATIENT
Start: 2023-05-30 | End: 2023-05-30

## 2023-05-30 RX ORDER — ALBUTEROL SULFATE 90 UG/1
2 AEROSOL, METERED RESPIRATORY (INHALATION) EVERY 6 HOURS PRN
Qty: 18 G | Refills: 0 | Status: SHIPPED | OUTPATIENT
Start: 2023-05-30

## 2023-05-30 RX ORDER — BENZONATATE 200 MG/1
200 CAPSULE ORAL 3 TIMES DAILY PRN
Qty: 30 CAPSULE | Refills: 0 | Status: SHIPPED | OUTPATIENT
Start: 2023-05-30 | End: 2023-06-09

## 2023-05-30 RX ORDER — PREDNISONE 10 MG/1
10 TABLET ORAL DAILY
Status: CANCELLED | OUTPATIENT
Start: 2023-05-30

## 2023-05-30 RX ORDER — PREDNISONE 20 MG/1
40 TABLET ORAL DAILY
Qty: 10 TABLET | Refills: 0 | Status: SHIPPED | OUTPATIENT
Start: 2023-05-30 | End: 2023-06-04

## 2023-05-30 RX ORDER — DOXYCYCLINE 100 MG/1
100 CAPSULE ORAL EVERY 12 HOURS
Qty: 10 CAPSULE | Refills: 0 | Status: SHIPPED | OUTPATIENT
Start: 2023-05-30 | End: 2023-06-04

## 2023-05-30 RX ADMIN — LEVALBUTEROL INHALATION SOLUTION 1.25 MG: 1.25 SOLUTION RESPIRATORY (INHALATION) at 02:05

## 2023-05-30 NOTE — PATIENT INSTRUCTIONS
Please drink plenty of fluids.  Please get plenty of rest.  Please utilize saltwater gargles for sore throat.  Please utilize warm tea, and lemon for sore throat relief.  Please utilize over-the-counter throat numbing spray and lozenges for sore throat relief.    Please return here or go to the Emergency Department for any concerns or worsening of condition.    Please take ALBUTEROL inhaler for wheezing/shortness of breath.  Please take DOXYCYCLINE for COPD exacerbation.  Please take PREDNISONE for COPD exacerbation.  Please take TESSALON for cough.    If you do have Hypertension or palpitations, it is safe to take Coricidin HBP for relief of sinus symptoms.  We recommend you take over the counter Flonase (Fluticasone) or another nasally inhaled steroid unless you are already taking one.  Nasal irrigation with a saline spray or Netti Pot like device per their directions is also recommended.  If not allergic, please take over the counter Tylenol (Acetaminophen) and/or Motrin (Ibuprofen) as directed for control of pain and/or fever.  Please follow up with your primary care doctor or specialist as needed.    If you  smoke, please stop smoking.

## 2023-05-30 NOTE — PROGRESS NOTES
"Subjective:      Patient ID: Floridalma Romero is a 77 y.o. female.    Vitals:  height is 5' 6" (1.676 m) and weight is 52.6 kg (116 lb). Her oral temperature is 98.3 °F (36.8 °C). Her blood pressure is 163/92 (abnormal) and her pulse is 69. Her respiration is 18 and oxygen saturation is 95%.     Chief Complaint: Cough    Patient states that she was here on 05/24/2023 and was diagnosed with an URI. She states that she was given some medications to help with her symptoms and was given a CXR as well. She states that she did not notice any improvement with her symptoms. She states that she is having nasal congestion, headache, productive cough (clear), sore throat, malaise. She states that her symptoms has been going on for about a week. She denies fever, chills, CP. She states that she is having some shortness of breath. Patient states that she smokes.    Cough  This is a new problem. The current episode started 1 to 4 weeks ago (5/21/23). The problem has been gradually worsening. The problem occurs every few minutes. The cough is Productive of sputum. Associated symptoms include ear congestion, headaches, nasal congestion, postnasal drip, shortness of breath and wheezing. Pertinent negatives include no chest pain, chills, ear pain, fever, heartburn, hemoptysis, myalgias, rash, rhinorrhea, sore throat, sweats or weight loss. Exacerbated by: coughing spells. Risk factors for lung disease include smoking/tobacco exposure. Treatments tried: aspirin, tylenol, iburpofen, salt water gargles, albuterol, flonase, mucinex, zyrtec. The treatment provided no relief. Her past medical history is significant for bronchitis. There is no history of asthma, bronchiectasis, COPD, emphysema, environmental allergies or pneumonia.     Constitution: Negative for chills and fever.   HENT:  Positive for postnasal drip. Negative for ear pain and sore throat.    Cardiovascular:  Positive for sob on exertion. Negative for chest pain. "   Respiratory:  Positive for cough, sputum production, shortness of breath and wheezing. Negative for bloody sputum.    Gastrointestinal:  Negative for abdominal pain, nausea, vomiting, diarrhea and heartburn.   Musculoskeletal:  Negative for muscle ache.   Skin:  Negative for rash.   Allergic/Immunologic: Negative for environmental allergies.   Neurological:  Positive for headaches.    Objective:     Physical Exam   Constitutional:  Non-toxic appearance. She does not appear ill. No distress. normal  HENT:   Head: Normocephalic.   Ears:   Right Ear: Tympanic membrane, external ear and ear canal normal. impacted cerumen  Left Ear: Tympanic membrane, external ear and ear canal normal. impacted cerumen  Nose: Nose normal. No rhinorrhea or congestion.   Mouth/Throat: Posterior oropharyngeal erythema present. No oropharyngeal exudate. Oropharynx is clear.   Cardiovascular: Normal rate, regular rhythm and normal heart sounds.   No murmur heard.Exam reveals no gallop and no friction rub.   Pulmonary/Chest: Effort normal. No stridor. No respiratory distress. She has wheezes (diffuse). She has no rhonchi. She has no rales. She exhibits no tenderness.         Comments: Patient is in no respiratory distress. Breath sounds even, unlabored, and diffuse wheezing to auscultation bilaterally. No accessory muscle usage. Patient able to speak in complete sentences with ease.    Abdominal: Normal appearance.   Neurological: She is alert.   Skin: Skin is not diaphoretic.   Nursing note and vitals reviewed.    Ambulatory pulse ox: decreased from 96% to 93%    XR CHEST PA AND LATERAL    Result Date: 5/30/2023  EXAMINATION: XR CHEST PA AND LATERAL CLINICAL HISTORY: Shortness of breath TECHNIQUE: PA and lateral views of the chest were performed. COMPARISON: 05/24/2023 and 02/02/2023 FINDINGS: The cardiomediastinal silhouette is stable with evidence of prior median sternotomy.  The aorta is tortuous and calcified.  The lungs are deeply  inflated.  There is stable biapical pleural thickening.  There is persistent mild blunting of the posterior costophrenic sulci on the lateral view which may be artifact related to hyperinflation, less likely the result of trace effusions or pleural thickening.  This appears similar to the lateral chest x-ray of 02/02/2023 as well.  There are degenerative changes in the spine.     Hyperinflation suggestive of COPD.  Previous median sternotomy.  No acute abnormality. Electronically signed by: Miroslava Garcia Date:    05/30/2023 Time:    14:57    Assessment:     1. COPD exacerbation    2. Wheezing    3. Shortness of breath      Plan:   Previous notes reviewed.  Vital signs reviewed.  Labs ordered. Labs reviewed.  Levalbuterol given in clinic. Patient states feeling relief with breathing treatment, wheezing improved after breathing treatment.  Discussed COPD exacerbation, home care, tx options, and given follow up precautions.  Patient was briefed on my thought process and diagnosis.   Patient involved with the treatment plan and agreed to the plan.  Patient informed on warning signs, patient understood warning signs and to go to urgent care or ER if warning signs appear.    Patient presents with a prolonged cough, wheezing, SOB on exertion, patient was seen in the urgent care and treated for an URI, patient states that her symptoms did not improve with the medications that she was given. CXR shows hyperinflation, suggestive of COPD. Will treat patient for a COPD exacerbation with follow up with PCP and ER precautions.    Patient Instructions   Please drink plenty of fluids.  Please get plenty of rest.  Please utilize saltwater gargles for sore throat.  Please utilize warm tea, and lemon for sore throat relief.  Please utilize over-the-counter throat numbing spray and lozenges for sore throat relief.    Please return here or go to the Emergency Department for any concerns or worsening of condition.    Please take  ALBUTEROL inhaler for wheezing/shortness of breath.  Please take DOXYCYCLINE for COPD exacerbation.  Please take PREDNISONE for COPD exacerbation.  Please take TESSALON for cough.    If you do have Hypertension or palpitations, it is safe to take Coricidin HBP for relief of sinus symptoms.  We recommend you take over the counter Flonase (Fluticasone) or another nasally inhaled steroid unless you are already taking one.  Nasal irrigation with a saline spray or Netti Pot like device per their directions is also recommended.  If not allergic, please take over the counter Tylenol (Acetaminophen) and/or Motrin (Ibuprofen) as directed for control of pain and/or fever.  Please follow up with your primary care doctor or specialist as needed.    If you  smoke, please stop smoking.    COPD exacerbation  -     albuterol (PROVENTIL HFA) 90 mcg/actuation inhaler; Inhale 2 puffs into the lungs every 6 (six) hours as needed for Wheezing or Shortness of Breath. Rescue  Dispense: 18 g; Refill: 0  -     doxycycline (VIBRAMYCIN) 100 MG Cap; Take 1 capsule (100 mg total) by mouth every 12 (twelve) hours. for 5 days  Dispense: 10 capsule; Refill: 0  -     predniSONE (DELTASONE) 20 MG tablet; Take 2 tablets (40 mg total) by mouth once daily. for 5 days  Dispense: 10 tablet; Refill: 0  -     benzonatate (TESSALON) 200 MG capsule; Take 1 capsule (200 mg total) by mouth 3 (three) times daily as needed for Cough.  Dispense: 30 capsule; Refill: 0    Wheezing  -     levalbuterol nebulizer solution 1.25 mg    Shortness of breath  -     XR CHEST PA AND LATERAL; Future; Expected date: 05/30/2023      Sandie Marley PA-C